# Patient Record
Sex: MALE | Race: WHITE | Employment: OTHER | URBAN - METROPOLITAN AREA
[De-identification: names, ages, dates, MRNs, and addresses within clinical notes are randomized per-mention and may not be internally consistent; named-entity substitution may affect disease eponyms.]

---

## 2019-09-10 RX ORDER — WARFARIN SODIUM 5 MG/1
5 TABLET ORAL DAILY
COMMUNITY

## 2019-09-10 RX ORDER — GLIPIZIDE 5 MG/1
5 TABLET ORAL
COMMUNITY

## 2019-09-10 RX ORDER — M-VIT,TX,IRON,MINS/CALC/FOLIC 27MG-0.4MG
1 TABLET ORAL DAILY
COMMUNITY

## 2019-09-10 RX ORDER — SIMVASTATIN 20 MG
20 TABLET ORAL NIGHTLY
COMMUNITY

## 2019-09-10 RX ORDER — OLMESARTAN MEDOXOMIL 20 MG/1
20 TABLET ORAL DAILY
COMMUNITY

## 2019-09-10 RX ORDER — DIGOXIN 0.05 MG/ML
0.25 SOLUTION ORAL DAILY
COMMUNITY

## 2019-09-10 RX ORDER — AMLODIPINE BESYLATE 10 MG/1
10 TABLET ORAL DAILY
COMMUNITY

## 2019-09-10 SDOH — HEALTH STABILITY: MENTAL HEALTH: HOW OFTEN DO YOU HAVE A DRINK CONTAINING ALCOHOL?: NEVER

## 2019-09-12 ENCOUNTER — ANESTHESIA EVENT (OUTPATIENT)
Dept: OPERATING ROOM | Age: 79
End: 2019-09-12
Payer: MEDICARE

## 2019-09-12 NOTE — ANESTHESIA PRE PROCEDURE
(+) sleep apnea: on CPAP,      (-) not a current smoker                           Cardiovascular:  Exercise tolerance: good (>4 METS),   (+) hypertension:, dysrhythmias: atrial fibrillation, hyperlipidemia    (-) past MI and  angina             ROS comment: H/O A FIB and SSS    ECHO: The left ventricular wall motion is normal.  Overall left ventricular ejection fraction is estimated to be 55-60%. Left ventricular systolic function is normal. (LVEF>/=55%)  There is mild concentric left ventricular hypertrophy. The left atrium is mildly dilated. There is mild mitral regurgitation. Neuro/Psych:      (-) seizures, TIA and CVA           GI/Hepatic/Renal:   (+) liver disease:,      (-) GERD and no renal disease       Endo/Other:    (+) DiabetesType II DM, , .    (-) hypothyroidism               Abdominal:           Vascular:     - DVT and PE. Anesthesia Plan      TIVA and MAC     ASA 3       Induction: intravenous. MIPS: Prophylactic antiemetics administered. Anesthetic plan and risks discussed with patient. Plan discussed with CRNA.           Liv Luis MD

## 2019-09-13 ENCOUNTER — ANESTHESIA (OUTPATIENT)
Dept: OPERATING ROOM | Age: 79
End: 2019-09-13
Payer: MEDICARE

## 2019-09-13 ENCOUNTER — HOSPITAL ENCOUNTER (OUTPATIENT)
Age: 79
Setting detail: OUTPATIENT SURGERY
Discharge: HOME | End: 2019-09-13
Attending: OPHTHALMOLOGY | Admitting: OPHTHALMOLOGY
Payer: MEDICARE

## 2019-09-13 VITALS — SYSTOLIC BLOOD PRESSURE: 137 MMHG | DIASTOLIC BLOOD PRESSURE: 53 MMHG | OXYGEN SATURATION: 100 %

## 2019-09-13 VITALS
RESPIRATION RATE: 14 BRPM | HEART RATE: 51 BPM | WEIGHT: 201 LBS | BODY MASS INDEX: 28.14 KG/M2 | DIASTOLIC BLOOD PRESSURE: 62 MMHG | SYSTOLIC BLOOD PRESSURE: 157 MMHG | OXYGEN SATURATION: 99 % | HEIGHT: 71 IN | TEMPERATURE: 97.4 F

## 2019-09-13 LAB
GLUCOSE BLD-MCNC: 131 MG/DL (ref 70–99)
PERFORMED ON: ABNORMAL

## 2019-09-13 PROCEDURE — 2500000003 HC RX 250 WO HCPCS: Performed by: OPHTHALMOLOGY

## 2019-09-13 PROCEDURE — 7100000010 HC PHASE II RECOVERY - FIRST 15 MIN: Performed by: OPHTHALMOLOGY

## 2019-09-13 PROCEDURE — 2580000003 HC RX 258: Performed by: ANESTHESIOLOGY

## 2019-09-13 PROCEDURE — 6360000002 HC RX W HCPCS: Performed by: NURSE ANESTHETIST, CERTIFIED REGISTERED

## 2019-09-13 PROCEDURE — 3700000000 HC ANESTHESIA ATTENDED CARE: Performed by: OPHTHALMOLOGY

## 2019-09-13 PROCEDURE — 6370000000 HC RX 637 (ALT 250 FOR IP): Performed by: OPHTHALMOLOGY

## 2019-09-13 PROCEDURE — 2709999900 HC NON-CHARGEABLE SUPPLY: Performed by: OPHTHALMOLOGY

## 2019-09-13 PROCEDURE — 6360000002 HC RX W HCPCS: Performed by: OPHTHALMOLOGY

## 2019-09-13 PROCEDURE — 7100000011 HC PHASE II RECOVERY - ADDTL 15 MIN: Performed by: OPHTHALMOLOGY

## 2019-09-13 PROCEDURE — V2632 POST CHMBR INTRAOCULAR LENS: HCPCS | Performed by: OPHTHALMOLOGY

## 2019-09-13 PROCEDURE — 3600000013 HC SURGERY LEVEL 3 ADDTL 15MIN: Performed by: OPHTHALMOLOGY

## 2019-09-13 PROCEDURE — 3600000003 HC SURGERY LEVEL 3 BASE: Performed by: OPHTHALMOLOGY

## 2019-09-13 PROCEDURE — 2500000003 HC RX 250 WO HCPCS: Performed by: ANESTHESIOLOGY

## 2019-09-13 PROCEDURE — 2500000003 HC RX 250 WO HCPCS: Performed by: NURSE ANESTHETIST, CERTIFIED REGISTERED

## 2019-09-13 PROCEDURE — 3700000001 HC ADD 15 MINUTES (ANESTHESIA): Performed by: OPHTHALMOLOGY

## 2019-09-13 DEVICE — ACRYSOF(R) IQ ASPHERIC NATURAL IOL, SINGLE-PIECE ACRYLIC FOLDABLE PCL, UV WITH BLUE LIGHTFILTER, 13.0MM LENGTH, 6.0MM ANTERIORASYMMETRIC BICONVEX OPTIC, PLANAR HAPTICS.
Type: IMPLANTABLE DEVICE | Site: EYE | Status: FUNCTIONAL
Brand: ACRYSOF®

## 2019-09-13 RX ORDER — PROPOFOL 10 MG/ML
INJECTION, EMULSION INTRAVENOUS PRN
Status: DISCONTINUED | OUTPATIENT
Start: 2019-09-13 | End: 2019-09-13 | Stop reason: SDUPTHER

## 2019-09-13 RX ORDER — PILOCARPINE HYDROCHLORIDE 20 MG/ML
SOLUTION/ DROPS OPHTHALMIC PRN
Status: DISCONTINUED | OUTPATIENT
Start: 2019-09-13 | End: 2019-09-13 | Stop reason: ALTCHOICE

## 2019-09-13 RX ORDER — PREDNISOLONE ACETATE 10 MG/ML
1 SUSPENSION/ DROPS OPHTHALMIC SEE ADMIN INSTRUCTIONS
Status: DISCONTINUED | OUTPATIENT
Start: 2019-09-13 | End: 2019-09-13 | Stop reason: HOSPADM

## 2019-09-13 RX ORDER — TETRACAINE HYDROCHLORIDE 5 MG/ML
SOLUTION OPHTHALMIC PRN
Status: DISCONTINUED | OUTPATIENT
Start: 2019-09-13 | End: 2019-09-13 | Stop reason: ALTCHOICE

## 2019-09-13 RX ORDER — LIDOCAINE HYDROCHLORIDE 20 MG/ML
INJECTION, SOLUTION INFILTRATION; PERINEURAL PRN
Status: DISCONTINUED | OUTPATIENT
Start: 2019-09-13 | End: 2019-09-13 | Stop reason: SDUPTHER

## 2019-09-13 RX ORDER — SODIUM CHLORIDE, SODIUM LACTATE, POTASSIUM CHLORIDE, CALCIUM CHLORIDE 600; 310; 30; 20 MG/100ML; MG/100ML; MG/100ML; MG/100ML
INJECTION, SOLUTION INTRAVENOUS CONTINUOUS
Status: DISCONTINUED | OUTPATIENT
Start: 2019-09-13 | End: 2019-09-13 | Stop reason: HOSPADM

## 2019-09-13 RX ORDER — SODIUM CHLORIDE, POTASSIUM CHLORIDE, CALCIUM CHLORIDE, MAGNESIUM CHLORIDE, SODIUM ACETATE, AND SODIUM CITRATE 6.4; .75; .48; .3; 3.9; 1.7 MG/ML; MG/ML; MG/ML; MG/ML; MG/ML; MG/ML
SOLUTION IRRIGATION PRN
Status: DISCONTINUED | OUTPATIENT
Start: 2019-09-13 | End: 2019-09-13 | Stop reason: ALTCHOICE

## 2019-09-13 RX ORDER — TOBRAMYCIN AND DEXAMETHASONE 3; 1 MG/ML; MG/ML
SUSPENSION/ DROPS OPHTHALMIC PRN
Status: DISCONTINUED | OUTPATIENT
Start: 2019-09-13 | End: 2019-09-13 | Stop reason: ALTCHOICE

## 2019-09-13 RX ORDER — PREDNISOLONE ACETATE 10 MG/ML
SUSPENSION/ DROPS OPHTHALMIC PRN
Status: DISCONTINUED | OUTPATIENT
Start: 2019-09-13 | End: 2019-09-13 | Stop reason: ALTCHOICE

## 2019-09-13 RX ORDER — LIDOCAINE HYDROCHLORIDE 10 MG/ML
0.3 INJECTION, SOLUTION EPIDURAL; INFILTRATION; INTRACAUDAL; PERINEURAL
Status: COMPLETED | OUTPATIENT
Start: 2019-09-13 | End: 2019-09-13

## 2019-09-13 RX ORDER — SODIUM CHLORIDE 0.9 % (FLUSH) 0.9 %
10 SYRINGE (ML) INJECTION EVERY 12 HOURS SCHEDULED
Status: DISCONTINUED | OUTPATIENT
Start: 2019-09-13 | End: 2019-09-13 | Stop reason: HOSPADM

## 2019-09-13 RX ORDER — TOBRAMYCIN AND DEXAMETHASONE 3; 1 MG/ML; MG/ML
1 SUSPENSION/ DROPS OPHTHALMIC SEE ADMIN INSTRUCTIONS
Status: DISCONTINUED | OUTPATIENT
Start: 2019-09-13 | End: 2019-09-13 | Stop reason: HOSPADM

## 2019-09-13 RX ORDER — SODIUM CHLORIDE 0.9 % (FLUSH) 0.9 %
10 SYRINGE (ML) INJECTION PRN
Status: DISCONTINUED | OUTPATIENT
Start: 2019-09-13 | End: 2019-09-13 | Stop reason: HOSPADM

## 2019-09-13 RX ADMIN — Medication 0.3 ML: at 08:46

## 2019-09-13 RX ADMIN — Medication 0.3 ML: at 08:35

## 2019-09-13 RX ADMIN — BROMFENAC SODIUM 1 DROP: 0.7 SOLUTION/ DROPS OPHTHALMIC at 08:36

## 2019-09-13 RX ADMIN — LIDOCAINE HYDROCHLORIDE 30 MG: 20 INJECTION, SOLUTION INFILTRATION; PERINEURAL at 09:37

## 2019-09-13 RX ADMIN — SODIUM CHLORIDE, POTASSIUM CHLORIDE, SODIUM LACTATE AND CALCIUM CHLORIDE: 600; 310; 30; 20 INJECTION, SOLUTION INTRAVENOUS at 08:45

## 2019-09-13 RX ADMIN — LIDOCAINE HYDROCHLORIDE 0.3 ML: 10 INJECTION, SOLUTION EPIDURAL; INFILTRATION; INTRACAUDAL; PERINEURAL at 08:46

## 2019-09-13 RX ADMIN — Medication 0.3 ML: at 09:02

## 2019-09-13 RX ADMIN — SODIUM CHLORIDE, POTASSIUM CHLORIDE, SODIUM LACTATE AND CALCIUM CHLORIDE: 600; 310; 30; 20 INJECTION, SOLUTION INTRAVENOUS at 09:37

## 2019-09-13 RX ADMIN — PROPOFOL 65 MG: 10 INJECTION, EMULSION INTRAVENOUS at 09:37

## 2019-09-13 RX ADMIN — TOBRAMYCIN AND DEXAMETHASONE: 3; 1 SUSPENSION/ DROPS OPHTHALMIC at 10:35

## 2019-09-13 ASSESSMENT — PAIN - FUNCTIONAL ASSESSMENT: PAIN_FUNCTIONAL_ASSESSMENT: 0-10

## 2019-09-13 ASSESSMENT — PAIN SCALES - GENERAL
PAINLEVEL_OUTOF10: 0
PAINLEVEL_OUTOF10: 0

## 2019-09-13 ASSESSMENT — PULMONARY FUNCTION TESTS
PIF_VALUE: 0

## 2019-09-13 ASSESSMENT — LIFESTYLE VARIABLES: SMOKING_STATUS: 0

## 2019-09-13 NOTE — PROGRESS NOTES
.Patient pre-op admission complete see flow sheet. IV started. Eye drops started per MD order see MAR. Safety checks complete. Call light and family at bedside.

## 2019-09-13 NOTE — PROGRESS NOTES
Bedside report received from [address] [place] [address] and [facility], pt awake vitals WNl,denies any pain. Scotty Willard

## 2019-09-13 NOTE — OP NOTE
phacoemilsification/aspiration device. This was performed in a bimanual/CHOP technique. The remaining cortex was then removed using the irrigation/aspiration device. The posterior capsule was polished using the I/A device with CAP/VAC settings. The capsular bag was reformed using Provisc. The previously selected Wayne Acrysof +21.0 diopter [id number] intraocular lens implant was then inserted using the cartridge system. It was spun in place using a Kuglen hook. It was centered and found to be in good, stable position. The remaining viscoelastic was then removed using the I/A device. The corneal incision was then hydrated using sterile BSS on a 30 gauge cannula. It was checked and found to be water-tight. Pilocarpine 2%, followed by Tobradex ophthalmic solutions were then instilled into the operative eye. The wire lid speculum was removed, and a postoperative protective shield was applied. The patient was then transferred to the postanesthesia recovery unit in good stable condition. The patient tolerated the procedure well without complications. A postoperative instruction sheet was given, and the patient will follow up with Dr. Juan Ramon [place] office as scheduled.       EBL: none    Specimen: none

## 2023-05-06 ENCOUNTER — HOSPITAL ENCOUNTER (EMERGENCY)
Age: 83
Discharge: HOME | End: 2023-05-06
Payer: MEDICARE

## 2023-05-06 VITALS
HEIGHT: 72 IN | SYSTOLIC BLOOD PRESSURE: 140 MMHG | DIASTOLIC BLOOD PRESSURE: 63 MMHG | BODY MASS INDEX: 26.14 KG/M2 | WEIGHT: 193 LBS | RESPIRATION RATE: 16 BRPM | TEMPERATURE: 98 F | HEART RATE: 51 BPM | OXYGEN SATURATION: 98 %

## 2023-05-06 DIAGNOSIS — S61.213A LACERATION OF LEFT MIDDLE FINGER W/O FOREIGN BODY W/O DAMAGE TO NAIL, INITIAL ENCOUNTER: Primary | ICD-10-CM

## 2023-05-06 PROCEDURE — 90471 IMMUNIZATION ADMIN: CPT | Performed by: PHYSICIAN ASSISTANT

## 2023-05-06 PROCEDURE — 6360000002 HC RX W HCPCS: Performed by: PHYSICIAN ASSISTANT

## 2023-05-06 PROCEDURE — 99284 EMERGENCY DEPT VISIT MOD MDM: CPT

## 2023-05-06 PROCEDURE — 12001 RPR S/N/AX/GEN/TRNK 2.5CM/<: CPT

## 2023-05-06 PROCEDURE — 90715 TDAP VACCINE 7 YRS/> IM: CPT | Performed by: PHYSICIAN ASSISTANT

## 2023-05-06 RX ADMIN — TETANUS TOXOID, REDUCED DIPHTHERIA TOXOID AND ACELLULAR PERTUSSIS VACCINE, ADSORBED 0.5 ML: 5; 2.5; 8; 8; 2.5 SUSPENSION INTRAMUSCULAR at 18:48

## 2023-05-06 ASSESSMENT — PAIN - FUNCTIONAL ASSESSMENT: PAIN_FUNCTIONAL_ASSESSMENT: NONE - DENIES PAIN

## 2023-05-06 NOTE — ED NOTES
Reviewed patient discharge instructions at this time, copy given to patient. No questions or concerns. Patient voiced understanding.         Yokasta Ward RN  05/06/23 [id number]

## 2023-05-06 NOTE — ED PROVIDER NOTES
[facility]        Pt Name: Pily Aguilera  MRN: [id number]  [place] 1940  Date of evaluation: 5/6/2023  Provider: Jeison Baker PA-C  PCP: Osmani Patino DO  Note Started: 5:58 PM EDT 5/6/23      CADY. I have evaluated this patient. My supervising physician was available for consultation. CHIEF COMPLAINT       Chief Complaint   Patient presents with    Laceration     Laceration noted to middle finger. Patient states he cut it on a nail. HISTORY OF PRESENT ILLNESS: 1 or more Elements     History From: patient and family  Limitations to history : None    Pily Aguilera is a 79 y.o. male who presents to the emergency department for evaluation of left finger laceration. He accidentally cut his finger on sharp edge of a nail and he tripped and fell into the bushes. He has a laceration to the left middle finger. Bleeding controlled. Intact sensation and good strength. He also has small wounds abrasions and small skin tear to the left upper arm from bushes. He is on blood thinners. He landed on his hands did not hit his head. No headache. No LOC. Denies any other injuries. Came in due to laceration of his finger. Alert and oriented  GCS 15. Nursing Notes were all reviewed and agreed with or any disagreements were addressed in the HPI. REVIEW OF SYSTEMS :      Review of Systems    Positives and Pertinent negatives as per HPI.      SURGICAL HISTORY     Past Surgical History:   Procedure Laterality Date    CARDIOVERSION  02/01/2017    CATARACT REMOVAL WITH IMPLANT Right 09/13/2019    COLONOSCOPY      INTRACAPSULAR CATARACT EXTRACTION Right 9/13/2019    PHACO EMULSIFICATION OF CATARACT WITH INTRAOCULAR LENS IMPLANT EYE performed by Niesha Huber MD at [address] [place] [address]      Diabetic retinal exam       CURRENTMEDICATIONS       Discharge Medication List as of 5/6/2023  6:51 PM        CONTINUE these medications

## 2023-05-06 NOTE — DISCHARGE INSTRUCTIONS
Clean daily with soap and water but no soaking or submerging wound. Suture removal in 10 days. Return to the ER for any worsening symptoms or signs of infection such as increased pain, redness, or drainage.

## 2024-02-23 ENCOUNTER — HOSPITAL ENCOUNTER (EMERGENCY)
Age: 84
Discharge: HOME | End: 2024-02-23
Attending: STUDENT IN AN ORGANIZED HEALTH CARE EDUCATION/TRAINING PROGRAM
Payer: MEDICARE

## 2024-02-23 ENCOUNTER — APPOINTMENT (OUTPATIENT)
Dept: CT IMAGING | Age: 84
End: 2024-02-23
Payer: MEDICARE

## 2024-02-23 ENCOUNTER — APPOINTMENT (OUTPATIENT)
Dept: GENERAL RADIOLOGY | Age: 84
End: 2024-02-23
Payer: MEDICARE

## 2024-02-23 VITALS
SYSTOLIC BLOOD PRESSURE: 174 MMHG | HEART RATE: 61 BPM | WEIGHT: 173 LBS | RESPIRATION RATE: 16 BRPM | TEMPERATURE: 98.2 F | BODY MASS INDEX: 23.43 KG/M2 | OXYGEN SATURATION: 95 % | DIASTOLIC BLOOD PRESSURE: 60 MMHG | HEIGHT: 72 IN

## 2024-02-23 DIAGNOSIS — E23.6 PITUITARY MASS (HCC): ICD-10-CM

## 2024-02-23 DIAGNOSIS — U07.1 COVID-19: ICD-10-CM
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
## 2024-02-23 LAB
ALBUMIN SERPL-MCNC: 4 G/DL (ref 3.4–5)
ALBUMIN/GLOB SERPL: 1.1 {RATIO} (ref 1.1–2.2)
ALP SERPL-CCNC: 103 U/L (ref 40–129)
ALT SERPL-CCNC: 80 U/L (ref 10–40)
ANION GAP SERPL CALCULATED.3IONS-SCNC: 11 MMOL/L (ref 3–16)
AST SERPL-CCNC: 160 U/L (ref 15–37)
BACTERIA URNS QL MICRO: ABNORMAL /HPF
BASOPHILS # BLD: 0.1 K/UL (ref 0–0.2)
BASOPHILS NFR BLD: 1.7 %
BILIRUB SERPL-MCNC: 0.7 MG/DL (ref 0–1)
BILIRUB UR QL STRIP.AUTO: NEGATIVE
BUN SERPL-MCNC: 18 MG/DL (ref 7–20)
CALCIUM SERPL-MCNC: 9.4 MG/DL (ref 8.3–10.6)
CHLORIDE SERPL-SCNC: 93 MMOL/L (ref 99–110)
CLARITY UR: CLEAR
CO2 SERPL-SCNC: 24 MMOL/L (ref 21–32)
COLOR UR: YELLOW
CREAT SERPL-MCNC: 1.4 MG/DL (ref 0.8–1.3)
DEPRECATED RDW RBC AUTO: 13.6 % (ref 12.4–15.4)
EKG ATRIAL RATE: 60 BPM
EKG DIAGNOSIS: NORMAL
EKG P AXIS: 74 DEGREES
EKG P-R INTERVAL: 238 MS
EKG Q-T INTERVAL: 448 MS
EKG QRS DURATION: 152 MS
EKG QTC CALCULATION (BAZETT): 448 MS
EKG R AXIS: -19 DEGREES
EKG T AXIS: 111 DEGREES
EKG VENTRICULAR RATE: 60 BPM
EOSINOPHIL # BLD: 0.3 K/UL (ref 0–0.6)
EOSINOPHIL NFR BLD: 3.4 %
EPI CELLS #/AREA URNS HPF: ABNORMAL /HPF (ref 0–5)
GFR SERPLBLD CREATININE-BSD FMLA CKD-EPI: 49 ML/MIN/{1.73_M2}
GLUCOSE SERPL-MCNC: 158 MG/DL (ref 70–99)
GLUCOSE UR STRIP.AUTO-MCNC: NEGATIVE MG/DL
HCT VFR BLD AUTO: 33.7 % (ref 40.5–52.5)
HGB BLD-MCNC: 11.8 G/DL (ref 13.5–17.5)
HGB UR QL STRIP.AUTO: ABNORMAL
KETONES UR STRIP.AUTO-MCNC: NEGATIVE MG/DL
LEUKOCYTE ESTERASE UR QL STRIP.AUTO: NEGATIVE
LYMPHOCYTES # BLD: 1.2 K/UL (ref 1–5.1)
LYMPHOCYTES NFR BLD: 14.3 %
MCH RBC QN AUTO: 29.4 PG (ref 26–34)
MCHC RBC AUTO-ENTMCNC: 35 G/DL (ref 31–36)
MCV RBC AUTO: 84.2 FL (ref 80–100)
MONOCYTES # BLD: 1 K/UL (ref 0–1.3)
MONOCYTES NFR BLD: 11.9 %
MUCOUS THREADS #/AREA URNS LPF: ABNORMAL /LPF
NEUTROPHILS # BLD: 5.6 K/UL (ref 1.7–7.7)
NEUTROPHILS NFR BLD: 68.7 %
NITRITE UR QL STRIP.AUTO: NEGATIVE
PH UR STRIP.AUTO: 6.5 [PH] (ref 5–8)
PLATELET # BLD AUTO: 166 K/UL (ref 135–450)
PMV BLD AUTO: 7.9 FL (ref 5–10.5)
POTASSIUM SERPL-SCNC: 4.9 MMOL/L (ref 3.5–5.1)
PROT SERPL-MCNC: 7.5 G/DL (ref 6.4–8.2)
PROT UR STRIP.AUTO-MCNC: >=300 MG/DL
RBC # BLD AUTO: 4 M/UL (ref 4.2–5.9)
RBC #/AREA URNS HPF: ABNORMAL /HPF (ref 0–4)
SARS-COV-2 RDRP RESP QL NAA+PROBE: DETECTED
SODIUM SERPL-SCNC: 128 MMOL/L (ref 136–145)
SP GR UR STRIP.AUTO: 1.02 (ref 1–1.03)
TROPONIN, HIGH SENSITIVITY: 68 NG/L (ref 0–22)
TROPONIN, HIGH SENSITIVITY: 73 NG/L (ref 0–22)
UA DIPSTICK W REFLEX MICRO PNL UR: YES
URN SPEC COLLECT METH UR: ABNORMAL
UROBILINOGEN UR STRIP-ACNC: 0.2 E.U./DL
WBC # BLD AUTO: 8.1 K/UL (ref 4–11)
WBC #/AREA URNS HPF: ABNORMAL /HPF (ref 0–5)

## 2024-02-23 PROCEDURE — 85025 COMPLETE CBC W/AUTO DIFF WBC: CPT

## 2024-02-23 PROCEDURE — 70450 CT HEAD/BRAIN W/O DYE: CPT

## 2024-02-23 PROCEDURE — 84484 ASSAY OF TROPONIN QUANT: CPT

## 2024-02-23 PROCEDURE — 80053 COMPREHEN METABOLIC PANEL: CPT

## 2024-02-23 PROCEDURE — 87635 SARS-COV-2 COVID-19 AMP PRB: CPT

## 2024-02-23 PROCEDURE — 99285 EMERGENCY DEPT VISIT HI MDM: CPT

## 2024-02-23 PROCEDURE — 81001 URINALYSIS AUTO W/SCOPE: CPT

## 2024-02-23 PROCEDURE — 36415 COLL VENOUS BLD VENIPUNCTURE: CPT

## 2024-02-23 PROCEDURE — 71045 X-RAY EXAM CHEST 1 VIEW: CPT

## 2024-02-23 PROCEDURE — 93005 ELECTROCARDIOGRAM TRACING: CPT | Performed by: STUDENT IN AN ORGANIZED HEALTH CARE EDUCATION/TRAINING PROGRAM

## 2024-02-23 ASSESSMENT — PAIN - FUNCTIONAL ASSESSMENT: PAIN_FUNCTIONAL_ASSESSMENT: NONE - DENIES PAIN

## 2024-02-23 NOTE — ED NOTES
Pt discharge instructions, follow up reviewed with pt. Pt verbalized understanding. No further needs. Pt discharged at this time.      Yes

## 2024-02-23 NOTE — ED PROVIDER NOTES
Alert and oriented X 3. CN II-XII intact. No gross facial drooping.  Strength 5/5 in all extremities.  Sensation intact. No pronator drift.   PSYCHIATRIC: Normal mood and affect.    DIAGNOSTIC RESULTS     LABS:   Labs Reviewed   COVID-19, RAPID - Abnormal; Notable for the following components:       Result Value    SARS-CoV-2, NAAT DETECTED (*)     All other components within normal limits   CBC WITH AUTO DIFFERENTIAL - Abnormal; Notable for the following components:    RBC 4.00 (*)     Hemoglobin 11.8 (*)     Hematocrit 33.7 (*)     All other components within normal limits   COMPREHENSIVE METABOLIC PANEL W/ REFLEX TO MG FOR LOW K - Abnormal; Notable for the following components:    Sodium 128 (*)     Chloride 93 (*)     Glucose 158 (*)     Creatinine 1.4 (*)     Est, Glom Filt Rate 49 (*)     ALT 80 (*)      (*)     All other components within normal limits   TROPONIN - Abnormal; Notable for the following components:    Troponin, High Sensitivity 73 (*)     All other components within normal limits   TROPONIN - Abnormal; Notable for the following components:    Troponin, High Sensitivity 68 (*)     All other components within normal limits   URINALYSIS - Abnormal; Notable for the following components:    Blood, Urine MODERATE (*)     Protein, UA >=300 (*)     All other components within normal limits   MICROSCOPIC URINALYSIS - Abnormal; Notable for the following components:    Mucus, UA 1+ (*)     Bacteria, UA Rare (*)     All other components within normal limits      When ordered only abnormal lab results are displayed. All other labs were within normal range or not returned as of this dictation.     RADIOLOGY:      Non-plain film images such as CT, Ultrasound and MRI are read by the radiologist. Plain radiographic images are visualized and preliminarily interpreted by the ED Provider with the below findings:   Interpretation per the Radiologist below, if available at the time of this note:  XR CHEST

## 2025-02-25 ENCOUNTER — APPOINTMENT (OUTPATIENT)
Dept: CT IMAGING | Age: 85
End: 2025-02-25
Payer: MEDICARE

## 2025-02-25 ENCOUNTER — APPOINTMENT (OUTPATIENT)
Dept: GENERAL RADIOLOGY | Age: 85
End: 2025-02-25
Payer: MEDICARE

## 2025-02-25 ENCOUNTER — HOSPITAL ENCOUNTER (EMERGENCY)
Age: 85
Discharge: ANOTHER HOSPITAL | End: 2025-02-26
Attending: EMERGENCY MEDICINE
Payer: MEDICARE

## 2025-02-25 DIAGNOSIS — T79.6XXA TRAUMATIC RHABDOMYOLYSIS, INITIAL ENCOUNTER: ICD-10-CM

## 2025-02-25 DIAGNOSIS — N17.9 AKI (ACUTE KIDNEY INJURY): Primary | ICD-10-CM

## 2025-02-25 DIAGNOSIS — A41.9 SEPSIS, DUE TO UNSPECIFIED ORGANISM, UNSPECIFIED WHETHER ACUTE ORGAN DYSFUNCTION PRESENT (HCC): ICD-10-CM

## 2025-02-25 LAB
ALBUMIN SERPL-MCNC: 3.4 G/DL (ref 3.4–5)
ALBUMIN/GLOB SERPL: 0.9 {RATIO} (ref 1.1–2.2)
ALP SERPL-CCNC: 73 U/L (ref 40–129)
ALT SERPL-CCNC: 30 U/L (ref 10–40)
ANION GAP SERPL CALCULATED.3IONS-SCNC: 17 MMOL/L (ref 3–16)
AST SERPL-CCNC: 88 U/L (ref 15–37)
BASE EXCESS BLDV CALC-SCNC: -0.6 MMOL/L (ref -3–3)
BASOPHILS # BLD: 0.2 K/UL (ref 0–0.2)
BASOPHILS NFR BLD: 1.3 %
BILIRUB SERPL-MCNC: 0.9 MG/DL (ref 0–1)
BUN SERPL-MCNC: 26 MG/DL (ref 7–20)
CALCIUM SERPL-MCNC: 9.4 MG/DL (ref 8.3–10.6)
CHLORIDE SERPL-SCNC: 96 MMOL/L (ref 99–110)
CK SERPL-CCNC: 2555 U/L (ref 39–308)
CO2 BLDV-SCNC: 26 MMOL/L
CO2 SERPL-SCNC: 22 MMOL/L (ref 21–32)
COHGB MFR BLDV: 1.9 % (ref 0–1.5)
CREAT SERPL-MCNC: 3.1 MG/DL (ref 0.8–1.3)
DEPRECATED RDW RBC AUTO: 13.7 % (ref 12.4–15.4)
DIGOXIN SERPL-MCNC: <0.3 NG/ML (ref 0.8–2)
EOSINOPHIL # BLD: 0.4 K/UL (ref 0–0.6)
EOSINOPHIL NFR BLD: 2 %
FLUAV RNA UPPER RESP QL NAA+PROBE: NEGATIVE
FLUBV AG NPH QL: NEGATIVE
GFR SERPLBLD CREATININE-BSD FMLA CKD-EPI: 19 ML/MIN/{1.73_M2}
GLUCOSE SERPL-MCNC: 219 MG/DL (ref 70–99)
HCO3 BLDV-SCNC: 24.4 MMOL/L (ref 23–29)
HCT VFR BLD AUTO: 40.4 % (ref 40.5–52.5)
HGB BLD-MCNC: 13.1 G/DL (ref 13.5–17.5)
INR PPP: 1.16 (ref 0.85–1.15)
LACTATE BLDV-SCNC: 2.7 MMOL/L (ref 0.4–1.9)
LYMPHOCYTES # BLD: 3.9 K/UL (ref 1–5.1)
LYMPHOCYTES NFR BLD: 21.9 %
MCH RBC QN AUTO: 28.7 PG (ref 26–34)
MCHC RBC AUTO-ENTMCNC: 32.5 G/DL (ref 31–36)
MCV RBC AUTO: 88.4 FL (ref 80–100)
METHGB MFR BLDV: 0.3 %
MONOCYTES # BLD: 1.4 K/UL (ref 0–1.3)
MONOCYTES NFR BLD: 7.8 %
NEUTROPHILS # BLD: 12 K/UL (ref 1.7–7.7)
NEUTROPHILS NFR BLD: 67 %
NT-PROBNP SERPL-MCNC: 3139 PG/ML (ref 0–449)
O2 THERAPY: ABNORMAL
PCO2 BLDV: 41.2 MMHG (ref 40–50)
PH BLDV: 7.39 [PH] (ref 7.35–7.45)
PLATELET # BLD AUTO: 240 K/UL (ref 135–450)
PMV BLD AUTO: 9.2 FL (ref 5–10.5)
PO2 BLDV: 27.2 MMHG (ref 25–40)
POTASSIUM SERPL-SCNC: 4.3 MMOL/L (ref 3.5–5.1)
PROT SERPL-MCNC: 7.1 G/DL (ref 6.4–8.2)
PROTHROMBIN TIME: 15 SEC (ref 11.9–14.9)
RBC # BLD AUTO: 4.57 M/UL (ref 4.2–5.9)
SAO2 % BLDV: 50 %
SARS-COV-2 RDRP RESP QL NAA+PROBE: NOT DETECTED
SODIUM SERPL-SCNC: 135 MMOL/L (ref 136–145)
TROPONIN, HIGH SENSITIVITY: 95 NG/L (ref 0–22)
WBC # BLD AUTO: 17.8 K/UL (ref 4–11)

## 2025-02-25 PROCEDURE — 93005 ELECTROCARDIOGRAM TRACING: CPT | Performed by: EMERGENCY MEDICINE

## 2025-02-25 PROCEDURE — 6360000002 HC RX W HCPCS: Performed by: STUDENT IN AN ORGANIZED HEALTH CARE EDUCATION/TRAINING PROGRAM

## 2025-02-25 PROCEDURE — 85610 PROTHROMBIN TIME: CPT

## 2025-02-25 PROCEDURE — 84484 ASSAY OF TROPONIN QUANT: CPT

## 2025-02-25 PROCEDURE — 82803 BLOOD GASES ANY COMBINATION: CPT

## 2025-02-25 PROCEDURE — 2580000003 HC RX 258: Performed by: EMERGENCY MEDICINE

## 2025-02-25 PROCEDURE — 51798 US URINE CAPACITY MEASURE: CPT

## 2025-02-25 PROCEDURE — 83605 ASSAY OF LACTIC ACID: CPT

## 2025-02-25 PROCEDURE — 36415 COLL VENOUS BLD VENIPUNCTURE: CPT

## 2025-02-25 PROCEDURE — 71045 X-RAY EXAM CHEST 1 VIEW: CPT

## 2025-02-25 PROCEDURE — 87635 SARS-COV-2 COVID-19 AMP PRB: CPT

## 2025-02-25 PROCEDURE — 72125 CT NECK SPINE W/O DYE: CPT

## 2025-02-25 PROCEDURE — 99285 EMERGENCY DEPT VISIT HI MDM: CPT

## 2025-02-25 PROCEDURE — 70450 CT HEAD/BRAIN W/O DYE: CPT

## 2025-02-25 PROCEDURE — 80162 ASSAY OF DIGOXIN TOTAL: CPT

## 2025-02-25 PROCEDURE — 2580000003 HC RX 258: Performed by: STUDENT IN AN ORGANIZED HEALTH CARE EDUCATION/TRAINING PROGRAM

## 2025-02-25 PROCEDURE — 83880 ASSAY OF NATRIURETIC PEPTIDE: CPT

## 2025-02-25 PROCEDURE — 87804 INFLUENZA ASSAY W/OPTIC: CPT

## 2025-02-25 PROCEDURE — 80053 COMPREHEN METABOLIC PANEL: CPT

## 2025-02-25 PROCEDURE — 85025 COMPLETE CBC W/AUTO DIFF WBC: CPT

## 2025-02-25 PROCEDURE — 96365 THER/PROPH/DIAG IV INF INIT: CPT

## 2025-02-25 PROCEDURE — 82550 ASSAY OF CK (CPK): CPT

## 2025-02-25 PROCEDURE — 87040 BLOOD CULTURE FOR BACTERIA: CPT

## 2025-02-25 PROCEDURE — 6370000000 HC RX 637 (ALT 250 FOR IP): Performed by: EMERGENCY MEDICINE

## 2025-02-25 RX ORDER — ACETAMINOPHEN 650 MG/1
650 SUPPOSITORY RECTAL ONCE
Status: COMPLETED | OUTPATIENT
Start: 2025-02-25 | End: 2025-02-25

## 2025-02-25 RX ORDER — 0.9 % SODIUM CHLORIDE 0.9 %
500 INTRAVENOUS SOLUTION INTRAVENOUS ONCE
Status: COMPLETED | OUTPATIENT
Start: 2025-02-25 | End: 2025-02-25

## 2025-02-25 RX ORDER — ACETAMINOPHEN 500 MG
1000 TABLET ORAL ONCE
Status: DISCONTINUED | OUTPATIENT
Start: 2025-02-25 | End: 2025-02-25

## 2025-02-25 RX ADMIN — ACETAMINOPHEN 650 MG: 650 SUPPOSITORY RECTAL at 20:35

## 2025-02-25 RX ADMIN — SODIUM CHLORIDE 500 ML: 0.9 INJECTION, SOLUTION INTRAVENOUS at 20:34

## 2025-02-25 RX ADMIN — SODIUM CHLORIDE 1000 MG: 9 INJECTION, SOLUTION INTRAVENOUS at 22:44

## 2025-02-26 ENCOUNTER — HOSPITAL ENCOUNTER (INPATIENT)
Age: 85
LOS: 3 days | Discharge: NURSING, REHAB OR LONG-TERM CARE | End: 2025-03-01
Attending: STUDENT IN AN ORGANIZED HEALTH CARE EDUCATION/TRAINING PROGRAM | Admitting: STUDENT IN AN ORGANIZED HEALTH CARE EDUCATION/TRAINING PROGRAM
Payer: MEDICARE

## 2025-02-26 VITALS
SYSTOLIC BLOOD PRESSURE: 119 MMHG | HEART RATE: 90 BPM | BODY MASS INDEX: 24.49 KG/M2 | TEMPERATURE: 98.7 F | DIASTOLIC BLOOD PRESSURE: 58 MMHG | OXYGEN SATURATION: 95 % | WEIGHT: 180.56 LBS | RESPIRATION RATE: 25 BRPM

## 2025-02-26 PROBLEM — M62.82 RHABDOMYOLYSIS: Status: ACTIVE | Noted: 2025-02-26

## 2025-02-26 LAB
ALBUMIN SERPL-MCNC: 3.5 G/DL (ref 3.4–5)
ALBUMIN/GLOB SERPL: 1 {RATIO}
ALP SERPL-CCNC: 69 U/L (ref 40–129)
ALT SERPL-CCNC: 32 U/L (ref 10–40)
ANION GAP SERPL CALCULATED.3IONS-SCNC: 16 MMOL/L (ref 3–16)
ANION GAP SERPL CALCULATED.3IONS-SCNC: 17 MMOL/L (ref 3–16)
AST SERPL-CCNC: 83 U/L (ref 15–37)
BASOPHILS # BLD: 0.06 K/UL (ref 0–0.2)
BASOPHILS # BLD: 0.06 K/UL (ref 0–0.2)
BASOPHILS NFR BLD: 0 %
BASOPHILS NFR BLD: 0 %
BILIRUB SERPL-MCNC: 0.6 MG/DL (ref 0–1)
BUN SERPL-MCNC: 33 MG/DL (ref 7–20)
BUN SERPL-MCNC: 37 MG/DL (ref 7–20)
CALCIUM SERPL-MCNC: 9.2 MG/DL (ref 8.3–10.6)
CALCIUM SERPL-MCNC: 9.3 MG/DL (ref 8.3–10.6)
CHLORIDE SERPL-SCNC: 98 MMOL/L (ref 99–110)
CHLORIDE SERPL-SCNC: 99 MMOL/L (ref 99–110)
CK SERPL-CCNC: 1827 U/L (ref 39–308)
CO2 SERPL-SCNC: 20 MMOL/L (ref 21–32)
CO2 SERPL-SCNC: 22 MMOL/L (ref 21–32)
CREAT SERPL-MCNC: 2.8 MG/DL (ref 0.8–1.3)
CREAT SERPL-MCNC: 3.1 MG/DL (ref 0.8–1.3)
EKG ATRIAL RATE: 110 BPM
EKG DIAGNOSIS: NORMAL
EKG P AXIS: 48 DEGREES
EKG P-R INTERVAL: 140 MS
EKG Q-T INTERVAL: 410 MS
EKG QRS DURATION: 140 MS
EKG QTC CALCULATION (BAZETT): 554 MS
EKG R AXIS: 22 DEGREES
EKG T AXIS: 169 DEGREES
EKG VENTRICULAR RATE: 110 BPM
EOSINOPHIL # BLD: 0.25 K/UL (ref 0–0.6)
EOSINOPHIL # BLD: 0.26 K/UL (ref 0–0.6)
EOSINOPHILS RELATIVE PERCENT: 2 %
EOSINOPHILS RELATIVE PERCENT: 2 %
ERYTHROCYTE [DISTWIDTH] IN BLOOD BY AUTOMATED COUNT: 12.6 % (ref 12.4–15.4)
ERYTHROCYTE [DISTWIDTH] IN BLOOD BY AUTOMATED COUNT: 12.6 % (ref 12.4–15.4)
GFR, ESTIMATED: 19 ML/MIN/1.73M2
GFR, ESTIMATED: 22 ML/MIN/1.73M2
GLUCOSE BLD-MCNC: 168 MG/DL (ref 70–99)
GLUCOSE BLD-MCNC: 173 MG/DL (ref 70–99)
GLUCOSE BLD-MCNC: 185 MG/DL (ref 70–99)
GLUCOSE BLD-MCNC: 195 MG/DL (ref 70–99)
GLUCOSE SERPL-MCNC: 172 MG/DL (ref 70–99)
GLUCOSE SERPL-MCNC: 204 MG/DL (ref 70–99)
HCT VFR BLD AUTO: 36.2 % (ref 40.5–52.5)
HCT VFR BLD AUTO: 36.8 % (ref 40.5–52.5)
HGB BLD-MCNC: 12.6 G/DL (ref 13.5–17.5)
HGB BLD-MCNC: 12.8 G/DL (ref 13.5–17.5)
IMM GRANULOCYTES # BLD AUTO: 0.1 K/UL (ref 0–0.5)
IMM GRANULOCYTES # BLD AUTO: 0.13 K/UL (ref 0–0.5)
IMM GRANULOCYTES NFR BLD: 1 %
IMM GRANULOCYTES NFR BLD: 1 %
LACTATE BLDV-SCNC: 2 MMOL/L (ref 0.4–2)
LYMPHOCYTES NFR BLD: 2.17 K/UL (ref 1–5.1)
LYMPHOCYTES NFR BLD: 2.46 K/UL (ref 1–5.1)
LYMPHOCYTES RELATIVE PERCENT: 16 %
LYMPHOCYTES RELATIVE PERCENT: 18 %
MCH RBC QN AUTO: 29.4 PG (ref 26–34)
MCH RBC QN AUTO: 29.5 PG (ref 26–34)
MCHC RBC AUTO-ENTMCNC: 34.8 G/DL (ref 31–36)
MCHC RBC AUTO-ENTMCNC: 34.8 G/DL (ref 31–36)
MCV RBC AUTO: 84.4 FL (ref 80–100)
MCV RBC AUTO: 84.8 FL (ref 80–100)
MONOCYTES NFR BLD: 1.08 K/UL (ref 0–1.3)
MONOCYTES NFR BLD: 1.09 K/UL (ref 0–1.3)
MONOCYTES NFR BLD: 8 %
MONOCYTES NFR BLD: 8 %
NEUTROPHILS NFR BLD: 71 %
NEUTROPHILS NFR BLD: 73 %
NEUTS SEG NFR BLD: 10.02 K/UL (ref 1.7–7.7)
NEUTS SEG NFR BLD: 9.38 K/UL (ref 1.7–7.7)
PLATELET # BLD AUTO: 188 K/UL (ref 135–450)
PLATELET # BLD AUTO: 190 K/UL (ref 135–450)
PMV BLD AUTO: 10 FL
PMV BLD AUTO: 10.2 FL
POTASSIUM SERPL-SCNC: 4.3 MMOL/L (ref 3.5–5.1)
POTASSIUM SERPL-SCNC: 4.5 MMOL/L (ref 3.5–5.1)
PROCALCITONIN SERPL-MCNC: 1.22 NG/ML (ref 0–0.15)
PROT SERPL-MCNC: 7 G/DL (ref 6.4–8.2)
RBC # BLD AUTO: 4.27 M/UL (ref 4.2–5.9)
RBC # BLD AUTO: 4.36 M/UL (ref 4.2–5.9)
SODIUM SERPL-SCNC: 135 MMOL/L (ref 136–145)
SODIUM SERPL-SCNC: 136 MMOL/L (ref 136–145)
TROPONIN I SERPL HS-MCNC: 81 NG/L (ref 0–22)
TROPONIN I SERPL HS-MCNC: 97 NG/L (ref 0–22)
WBC OTHER # BLD: 13.3 K/UL (ref 4–11)
WBC OTHER # BLD: 13.7 K/UL (ref 4–11)

## 2025-02-26 PROCEDURE — 85025 COMPLETE CBC W/AUTO DIFF WBC: CPT

## 2025-02-26 PROCEDURE — 97530 THERAPEUTIC ACTIVITIES: CPT

## 2025-02-26 PROCEDURE — 97165 OT EVAL LOW COMPLEX 30 MIN: CPT

## 2025-02-26 PROCEDURE — 51798 US URINE CAPACITY MEASURE: CPT

## 2025-02-26 PROCEDURE — 36415 COLL VENOUS BLD VENIPUNCTURE: CPT

## 2025-02-26 PROCEDURE — 6360000002 HC RX W HCPCS: Performed by: STUDENT IN AN ORGANIZED HEALTH CARE EDUCATION/TRAINING PROGRAM

## 2025-02-26 PROCEDURE — 82962 GLUCOSE BLOOD TEST: CPT

## 2025-02-26 PROCEDURE — 84145 PROCALCITONIN (PCT): CPT

## 2025-02-26 PROCEDURE — 84484 ASSAY OF TROPONIN QUANT: CPT

## 2025-02-26 PROCEDURE — 51702 INSERT TEMP BLADDER CATH: CPT

## 2025-02-26 PROCEDURE — 80053 COMPREHEN METABOLIC PANEL: CPT

## 2025-02-26 PROCEDURE — 6370000000 HC RX 637 (ALT 250 FOR IP): Performed by: STUDENT IN AN ORGANIZED HEALTH CARE EDUCATION/TRAINING PROGRAM

## 2025-02-26 PROCEDURE — 81001 URINALYSIS AUTO W/SCOPE: CPT

## 2025-02-26 PROCEDURE — 6360000002 HC RX W HCPCS: Performed by: HOSPITALIST

## 2025-02-26 PROCEDURE — 99223 1ST HOSP IP/OBS HIGH 75: CPT | Performed by: HOSPITALIST

## 2025-02-26 PROCEDURE — 92610 EVALUATE SWALLOWING FUNCTION: CPT

## 2025-02-26 PROCEDURE — 97110 THERAPEUTIC EXERCISES: CPT

## 2025-02-26 PROCEDURE — 2060000000 HC ICU INTERMEDIATE R&B

## 2025-02-26 PROCEDURE — 83605 ASSAY OF LACTIC ACID: CPT

## 2025-02-26 PROCEDURE — 92526 ORAL FUNCTION THERAPY: CPT

## 2025-02-26 PROCEDURE — 97162 PT EVAL MOD COMPLEX 30 MIN: CPT

## 2025-02-26 PROCEDURE — 82550 ASSAY OF CK (CPK): CPT

## 2025-02-26 PROCEDURE — 2580000003 HC RX 258: Performed by: STUDENT IN AN ORGANIZED HEALTH CARE EDUCATION/TRAINING PROGRAM

## 2025-02-26 PROCEDURE — 93010 ELECTROCARDIOGRAM REPORT: CPT | Performed by: INTERNAL MEDICINE

## 2025-02-26 PROCEDURE — 2500000003 HC RX 250 WO HCPCS: Performed by: STUDENT IN AN ORGANIZED HEALTH CARE EDUCATION/TRAINING PROGRAM

## 2025-02-26 PROCEDURE — 6370000000 HC RX 637 (ALT 250 FOR IP): Performed by: HOSPITALIST

## 2025-02-26 PROCEDURE — 80048 BASIC METABOLIC PNL TOTAL CA: CPT

## 2025-02-26 RX ORDER — ONDANSETRON 4 MG/1
4 TABLET, ORALLY DISINTEGRATING ORAL EVERY 8 HOURS PRN
Status: DISCONTINUED | OUTPATIENT
Start: 2025-02-26 | End: 2025-03-01 | Stop reason: HOSPADM

## 2025-02-26 RX ORDER — POLYETHYLENE GLYCOL 3350 17 G/17G
17 POWDER, FOR SOLUTION ORAL DAILY PRN
Status: DISCONTINUED | OUTPATIENT
Start: 2025-02-26 | End: 2025-03-01 | Stop reason: HOSPADM

## 2025-02-26 RX ORDER — GLUCAGON 1 MG/ML
1 KIT INJECTION PRN
Status: DISCONTINUED | OUTPATIENT
Start: 2025-02-26 | End: 2025-03-01 | Stop reason: HOSPADM

## 2025-02-26 RX ORDER — INSULIN LISPRO 100 [IU]/ML
0-4 INJECTION, SOLUTION INTRAVENOUS; SUBCUTANEOUS
Status: DISCONTINUED | OUTPATIENT
Start: 2025-02-26 | End: 2025-03-01 | Stop reason: HOSPADM

## 2025-02-26 RX ORDER — LINEZOLID 2 MG/ML
600 INJECTION, SOLUTION INTRAVENOUS EVERY 12 HOURS
Status: DISCONTINUED | OUTPATIENT
Start: 2025-02-26 | End: 2025-02-28

## 2025-02-26 RX ORDER — SODIUM CHLORIDE 0.9 % (FLUSH) 0.9 %
5-40 SYRINGE (ML) INJECTION EVERY 12 HOURS SCHEDULED
Status: DISCONTINUED | OUTPATIENT
Start: 2025-02-26 | End: 2025-03-01 | Stop reason: HOSPADM

## 2025-02-26 RX ORDER — ENOXAPARIN SODIUM 100 MG/ML
30 INJECTION SUBCUTANEOUS DAILY
Status: DISCONTINUED | OUTPATIENT
Start: 2025-02-26 | End: 2025-02-28

## 2025-02-26 RX ORDER — GLIPIZIDE 2.5 MG/1
TABLET, EXTENDED RELEASE ORAL
COMMUNITY
Start: 2025-02-17

## 2025-02-26 RX ORDER — LABETALOL HYDROCHLORIDE 5 MG/ML
10 INJECTION, SOLUTION INTRAVENOUS EVERY 6 HOURS PRN
Status: DISCONTINUED | OUTPATIENT
Start: 2025-02-26 | End: 2025-03-01 | Stop reason: HOSPADM

## 2025-02-26 RX ORDER — SODIUM CHLORIDE 9 MG/ML
INJECTION, SOLUTION INTRAVENOUS PRN
Status: DISCONTINUED | OUTPATIENT
Start: 2025-02-26 | End: 2025-03-01 | Stop reason: HOSPADM

## 2025-02-26 RX ORDER — ACETAMINOPHEN 325 MG/1
650 TABLET ORAL EVERY 6 HOURS PRN
Status: DISCONTINUED | OUTPATIENT
Start: 2025-02-26 | End: 2025-03-01 | Stop reason: HOSPADM

## 2025-02-26 RX ORDER — OXYCODONE HYDROCHLORIDE 5 MG/1
5 TABLET ORAL EVERY 4 HOURS PRN
Status: DISCONTINUED | OUTPATIENT
Start: 2025-02-26 | End: 2025-03-01 | Stop reason: HOSPADM

## 2025-02-26 RX ORDER — ONDANSETRON 2 MG/ML
4 INJECTION INTRAMUSCULAR; INTRAVENOUS EVERY 6 HOURS PRN
Status: DISCONTINUED | OUTPATIENT
Start: 2025-02-26 | End: 2025-03-01 | Stop reason: HOSPADM

## 2025-02-26 RX ORDER — SODIUM CHLORIDE 9 MG/ML
INJECTION, SOLUTION INTRAVENOUS CONTINUOUS
Status: ACTIVE | OUTPATIENT
Start: 2025-02-26 | End: 2025-02-27

## 2025-02-26 RX ORDER — OXYCODONE HYDROCHLORIDE 5 MG/1
10 TABLET ORAL EVERY 4 HOURS PRN
Status: DISCONTINUED | OUTPATIENT
Start: 2025-02-26 | End: 2025-03-01 | Stop reason: HOSPADM

## 2025-02-26 RX ORDER — ACETAMINOPHEN 650 MG/1
650 SUPPOSITORY RECTAL EVERY 6 HOURS PRN
Status: DISCONTINUED | OUTPATIENT
Start: 2025-02-26 | End: 2025-03-01 | Stop reason: HOSPADM

## 2025-02-26 RX ORDER — ASPIRIN 81 MG/1
81 TABLET, CHEWABLE ORAL DAILY
Status: DISCONTINUED | OUTPATIENT
Start: 2025-02-26 | End: 2025-03-01 | Stop reason: HOSPADM

## 2025-02-26 RX ORDER — SODIUM CHLORIDE 0.9 % (FLUSH) 0.9 %
5-40 SYRINGE (ML) INJECTION PRN
Status: DISCONTINUED | OUTPATIENT
Start: 2025-02-26 | End: 2025-03-01 | Stop reason: HOSPADM

## 2025-02-26 RX ORDER — DEXTROSE MONOHYDRATE 100 MG/ML
INJECTION, SOLUTION INTRAVENOUS CONTINUOUS PRN
Status: DISCONTINUED | OUTPATIENT
Start: 2025-02-26 | End: 2025-03-01 | Stop reason: HOSPADM

## 2025-02-26 RX ADMIN — SODIUM CHLORIDE 1500 MG: 9 INJECTION, SOLUTION INTRAVENOUS at 09:23

## 2025-02-26 RX ADMIN — INSULIN LISPRO 1 UNITS: 100 INJECTION, SOLUTION INTRAVENOUS; SUBCUTANEOUS at 11:51

## 2025-02-26 RX ADMIN — LINEZOLID 600 MG: 600 INJECTION, SOLUTION INTRAVENOUS at 21:42

## 2025-02-26 RX ADMIN — CEFEPIME 1000 MG: 1 INJECTION, POWDER, FOR SOLUTION INTRAMUSCULAR; INTRAVENOUS at 05:17

## 2025-02-26 RX ADMIN — SODIUM CHLORIDE, PRESERVATIVE FREE 10 ML: 5 INJECTION INTRAVENOUS at 21:44

## 2025-02-26 RX ADMIN — CEFEPIME 1000 MG: 1 INJECTION, POWDER, FOR SOLUTION INTRAMUSCULAR; INTRAVENOUS at 21:39

## 2025-02-26 RX ADMIN — ASPIRIN 81 MG: 81 TABLET, CHEWABLE ORAL at 17:43

## 2025-02-26 RX ADMIN — ENOXAPARIN SODIUM 30 MG: 100 INJECTION SUBCUTANEOUS at 08:49

## 2025-02-26 RX ADMIN — SODIUM CHLORIDE: 0.9 INJECTION, SOLUTION INTRAVENOUS at 05:09

## 2025-02-26 RX ADMIN — ACETAMINOPHEN 650 MG: 325 TABLET ORAL at 11:39

## 2025-02-26 RX ADMIN — ACETAMINOPHEN 650 MG: 325 TABLET ORAL at 17:43

## 2025-02-26 ASSESSMENT — PAIN DESCRIPTION - LOCATION: LOCATION: HEAD

## 2025-02-26 ASSESSMENT — PAIN SCALES - GENERAL: PAINLEVEL_OUTOF10: 1

## 2025-02-26 ASSESSMENT — PAIN DESCRIPTION - DESCRIPTORS: DESCRIPTORS: ACHING

## 2025-02-26 NOTE — PROGRESS NOTES
Speech Language Pathology  Clinical Bedside Swallow Assessment and Treatment Note  Facility/Department: [facility] PROGRESSIVE CARE    Recommendations:   Diet recommendation: IDDSI 5 Minced and moist Solids; IDDSI 0 Thin Liquids; Meds PO as tolerated  Instrumentation: Not clinically indicated at this time. Will continue to monitor  Risk management: upright for all intake, stay upright for at least 30 mins after intake, small bites/sips, 1:1 supervision with intake, oral care 2-3x/day to reduce adverse affects in the event of aspiration, increase physical mobility as able, alternate bites/sips, slow rate of intake, general GERD precautions, STRICT aspiration precautions, and hold PO and contact SLP if s/s of aspiration or worsening respiratory status develop.    NAME:Yrn Martinez  : 1940 (84 y.o.)   MRN: [id number]  ROOM: [address] [place] [address]  ADMISSION DATE: 2025  PATIENT DIAGNOSIS(ES): Rhabdomyolysis [M62.82]  No chief complaint on file.    Patient Active Problem List    Diagnosis Date Noted    Rhabdomyolysis 2025     Past Medical History:   Diagnosis Date    Atrial fibrillation (HCC)     Diabetes mellitus (HCC)     Hyperlipidemia     Hypertension     Rosacea     Sleep apnea     SSS (sick sinus syndrome) ([facility])          Past Surgical History:   Procedure Laterality Date    CARDIOVERSION  2017    CATARACT REMOVAL WITH IMPLANT Right 2019    COLONOSCOPY      INTRACAPSULAR CATARACT EXTRACTION Right 2019    PHACO EMULSIFICATION OF CATARACT WITH INTRAOCULAR LENS IMPLANT EYE performed by Yoan Dunne MD at [facility]    OTHER SURGICAL HISTORY      Diabetic retinal exam     Allergies   Allergen Reactions    Dairycare [Bacid]     Lisinopril      cough       DATE ONSET: 2025    Date of Evaluation: 2025   Evaluating Therapist: TINO Mata    Chart Reviewed: : [x] Yes [] No     Current Diet: Diet NPO    Medical record review/interview: Per MD H&P on 2025:   \" Yrn VALE Martinez

## 2025-02-26 NOTE — PROGRESS NOTES
[facility]    Pharmacy Progress Note    Vancomycin - Management by Pharmacy    Consult Date(s): 2/26/25  Consulting Provider(s): Dr. Sahu    Assessment / Plan  Sepsis of Unknown Etiology - Vancomycin  Concurrent Antimicrobials: Cefepime  Day of Vanc Therapy / Ordered Duration: Day 1 of 7  Current Dosing Method: Intermittent Dosing by Levels  Therapeutic Goal: Trough ~ 15 mg/L  Current Dose / Plan:   ABDELRAHMAN on admission - Scr 3.1 mg/dL  Received a one time dose of vancomycin 1500 mg this morning  Will check a vancomycin random level tomorrow morning  Will continue to monitor clinical condition and make adjustments to regimen as appropriate.    Thank you for consulting pharmacy,    Marina Hernandez, PharmD  Main Pharmacy = x [phone]      Subjective/Objective:   Yrn Martinez is a 84 y.o. male who is admitted with concern for sepsis.     Pharmacy is consulted to dose vancomycin.    Ht Readings from Last 1 Encounters:   02/26/25 1.829 m (6')     Wt Readings from Last 1 Encounters:   02/26/25 81.9 kg (180 lb 8.9 oz)     Current & Prior Antimicrobial Regimen(s):  Cefepime  Vancomycin    Vancomycin Level(s) / Doses:    Date Time Dose Type of Level / Level Interpretation                 Note: Serum levels collected for AUC-based dosing may be high if collected in close proximity to the dose administered. This is not necessarily indicative of toxicity.    Cultures & Sensitivities:    2/26/25: Blood cx x 2: In process    Recent Labs     02/25/25  1802 02/26/25  0455   CREATININE 3.1* 3.1*   BUN 26* 33*   WBC 17.8* 13.3*       Estimated Creatinine Clearance: 19 mL/min (A) (based on SCr of 3.1 mg/dL (H)).    Additional Lab Values / Findings of Note:    No results for input(s): \"PROCAL\" in the last 72 hours.

## 2025-02-26 NOTE — ED PROVIDER NOTES
will be admitted for further evaluation and treatment.  He is currently in the emergency room pending transfer to [facility].       DDX-rhabdomyolysis, ABDELRAHMAN, sepsis  Social Determinants (Poverty, Education, uninsured) -  Prior notes-PMD notes reviewed  Name and route all medications-see meds section above  Charting interpretations all by myself  Diagnosis descriptions-moderate  Consults- None  Disposition- Considered - admission          I PERSONALLY SAW THE PATIENT AND PERFORMED A SUBSTANTIVE PORTION OF THE VISIT INCLUDING ALL ASPECTS OF THE MEDICAL DECISION MAKING PROCESS.     The primary clinician of record Shantell Nash MD    ClINICAL IMPRESSION:  1. ABDELRAHMAN (acute kidney injury)    2. Traumatic rhabdomyolysis, initial encounter    3. Sepsis, due to unspecified organism, unspecified whether acute organ dysfunction present (HCC)          PDISPOSITION Decision To Transfer 02/25/2025 10:20:59 PM   DISPOSITION CONDITION Stable         ______________________________________________________________________  CRITICAL CARE NOTE:  There was a high probability of clinically significant life-threatening deterioration of the patient's condition requiring my urgent intervention.     This includes multiple reevaluations, vital sign monitoring, pulse oximetry monitoring, telemetry monitoring, clinical response to the IV medications, reviewing the nursing notes, consultation time, dictation/documentation time, and interpretation of the labwork. (This time excludes time spent performing procedures).     I personally saw the patient and independently provided 36 minutes of non-concurrent critical care out of the total shared critical care time provided.    _____________________________________________________________________________________________  This record is transcribed utilizing voice recognition technology. There are inherent limitations in this technology. In addition, there may be limitations in editing of this

## 2025-02-26 NOTE — CONSULTS
Pharmacy Note  Vancomycin Consult    Yrn Martinez is a 84 y.o. male started on Vancomycin for Sepsis; consult received from Dr. Sahu to manage therapy. Also receiving the following antibiotics: Cefepime.    Allergies:  Dairycare [bacid] and Lisinopril     Tmax:     Micro:     Recent Labs     02/25/25  1802   CREATININE 3.1*       Recent Labs     02/25/25  1802 02/26/25  0455   WBC 17.8* 13.3*       Estimated Creatinine Clearance: 19 mL/min (A) (based on SCr of 3.1 mg/dL (H)).    No intake or output data in the 24 hours ending 02/26/25 0516    Wt Readings from Last 1 Encounters:   02/26/25 81.9 kg (180 lb 8.9 oz)         Body mass index is 24.49 kg/m².    Loading dose (critically ill or in ICU, require dialysis or renal replacement therapy): Vancomycin 25 mg/kg IVPB x 1 (maximum 2500 mg).  Maintenance dose: 10-20 mg/kg (maximum: 2000 mg/dose and 4500 mg/day) starting at the next dosing interval determined by renal function  Pulse dose: fluctuating renal function, ABDELRAHMAN, ESRD   Goal Vancomycin trough: 15-20 mcg/mL   Goal Vancomycin AUC: 400-600     Assessment/Plan:  Will initiate Vancomycin with a one time loading dose of 1500 mg x1. Based on the patient age and renal function, will pulse dose patient to maintain vancomycin levels > 15 mcg/mL. Vancomycin level ordered for 2/26 at 1500. Timing of trough level will be determined based on culture results, renal function, and clinical response.     Thank you for the consult.  Homero Leija, PharmD  2/26/2025 5:17 AM

## 2025-02-26 NOTE — PROGRESS NOTES
RN contacted Dr. Oliveira to see if it would be okay to order a picc line for this patient for lab draws and iv atb d/t pt pulling out iv and continuing to pull at other lines. Dr. Oliveira said to check with Nephrology. RN contacted Dr. Moran (Nephro) who was covering for Dr. Dillon. Message was forwarded to Dr. Dillon. RN awaiting response.

## 2025-02-26 NOTE — PROGRESS NOTES
C ordered d/t patient becoming restless, attempting to get out of chair, taking off clothes, tele, and pulling at his iv line despite redirection multiple times. Pt transferred from chair to bed, and now resting with bed locked in lowest position with bed alarm on and call light in reach.

## 2025-02-26 NOTE — PROGRESS NOTES
to be seen after this session, please let this note serve as discharge summary.  Please see case management note for discharge disposition.  Thank you.    Assessment/Clinical Assessment:   Pt seen today for physical therapy Evaluation & Treatment.   Pt demonstrated decreased Activity tolerance, Functional Mobility , Balance, and Strengthas well as decreased independence with Ambulation, Bed Mobility , and Transfers.   Most likely recommend SNF, but pending inpt progress. Pt very confused and agitated wanting to sleep; unable to follow simple commands and BLE needing significant A for ROM.    Clinical Assessment:   Patient seen today for   [x] Therex completed & addressed:LE AROM and AAROM   [] Theract completed & addressed:   [] Gait completed & addressed:   [] NM re-education completed & addressed:    Pt very confused/ agitated wanting to sleep; unable to follow simple commands and BLE needing significant A for ROM.Attempted to sit EOB, but pt unwilling to cooperate, stating \"I want to sleep\" Able to do AAROM LE therex, with pt helping after more reps.Most likely recommend SNF, but pending inpt progress. Will re-evaluate as schedule permits.       2nd session: Pt seen for co-eval/treat with PT/OT for cog, endurance, x2 A; PT helping with mobility and balance, while OT helping with ADLs. Pt still confused, but less agitated and more cooperative. Able to move slightly better than AM session. Pt requiring max x2 for bed mobility and Mod x2 for transfers and short ambulation to the chair. Recommend SNF      Activity Tolerance:See below.   Impairments Requiring Therapeutic Intervention: decreased functional mobility, decreased ADL status, decreased ROM, decreased strength, decreased cognition, decreased endurance, decreased balance, decreased vision  Rehab Potential:  fair  ___________________________________________________________________________________________________________________________________________  Precautions/Restrictions: high fall risk  Weight Bearing Restrictions:no restrictions  [] Left Upper Extremity  [] Right Upper Extremity   [] Left Lower Extremity  [] Right Lower Extremity     Required Braces/Orthotics: no braces required   [] Left  [] Right  Positional Restrictions:no positional restrictions     Pre-Admission Information   Lives With: spouse  Takes care of WC bound wife      Type of Home:  [facility]  Home Layout: one level  Home Access: level entry  Bathroom Layout: wheelchair accessible  Bathroom Equipment: grab bars in shower, shower chair  Toilet Height: elevated height  Home Equipment: rolling walker, single point cane, manual wheelchair  Transfer Assistance: Independent without use of device  Ambulation Assistance:Independent without use of device  ADL Assistance: independent with all ADL's  IADL Assistance: independent with homemaking tasks  Active :        [x] Yes  [] No  Current Employment: retired   Recent Falls: admission fall- denies prior falls    Examination   Vision:   Vision Corrective Device: wears glasses for reading  Hearing:   hard of hearing  Observation:   General Observation:  IV, telemetry   Posture:   Unable to formally assess as pt supine  Sensation:   denies numbness and tingling  Proprioception: [x]  intact BLE hallux     Tone: Unable to formally assess; 2/2 agitated [] normotonic BLE         Coordination Testing:   Unable to formally test secondary to Agitation    ROM:   (B) LE AROM WFL  Strength:   Formal MMT held secondary to Agitation    Therapist Clinical Decision Making (Complexity): medium complexity  Clinical Presentation: stable    Cognition  Overall Cognitive Status: Impaired  Orientation:    oriented to person, disoriented to place, disoriented to time , and disoriented to situation  Command

## 2025-02-26 NOTE — CARE COORDINATION
Case Management Assessment  Initial Evaluation    Date/Time of Evaluation: 2/26/2025 9:46 AM  Assessment Completed by: ANAM Person    If patient is discharged prior to next notation, then this note serves as note for discharge by case management.    Patient Name: Yrn aMrtinez                   YOB: 1940  Diagnosis: Rhabdomyolysis [M62.82]                   Date / Time: 2/26/2025  2:35 AM    Patient Admission Status: Inpatient   Readmission Risk (Low < 19, Mod (19-27), High > 27): Readmission Risk Score: 12.5    Current PCP: Vicky Nowak, DO  PCP verified by CM? Yes    Chart Reviewed: Yes      History Provided by: Child/Family  Patient Orientation: Other (see comment) (confused)    Patient Cognition: Other (see comment) (confused)    Hospitalization in the last 30 days (Readmission):  No    If yes, Readmission Assessment in CM Navigator will be completed.    Advance Directives:      Code Status: Full Code   Patient's Primary Decision Maker is: Legal Next of Kin      Discharge Planning:    Patient lives with: Spouse/Significant Other Type of Home: Skilled Nursing Facility  Primary Care Giver: Family  Patient Support Systems include: Children   Current Financial resources: Medicare  Current community resources: ECF/Home Care  Current services prior to admission: None, Durable Medical Equipment            Current DME: Walker            Type of Home Care services:  None    ADLS  Prior functional level: Independent in ADLs/IADLs  Current functional level: Assistance with the following:, Bathing, Dressing, Toileting, Cooking, Housework, Shopping, Mobility    PT AM-PAC: 6 /24  OT AM-PAC:   /24    Family can provide assistance at DC: No  Would you like Case Management to discuss the discharge plan with any other family members/significant others, and if so, who? No  Plans to Return to Present Housing: Unknown at present  Other Identified Issues/Barriers to RETURNING to current housing:

## 2025-02-26 NOTE — CONSULTS
Vancomycin has been discontinued. Pharmacy will sign off consult. If medication dosing is resumed, please re-consult pharmacy.    Alex Lee PharmD  [facility]   [id number]  2/26/2025   3:46 PM

## 2025-02-26 NOTE — PROGRESS NOTES
Patient seen and examined at bed side        ABDELRAHMAN    Possible sepsis    Elevated troponin    Rhabdomyolysis    H/o Afib    Plan    Cont IVF      ABDELRAHMAN    IVF  Moniter BMP  Avoid NSAIDs  Avoid contrast  Avoid ACEI or ARB  Keep MAP > 65 mmhg    Consult Nephrology    Elevated trop : could be 2/2 ABDELRAHMAN , sepsis     Will moniter for now    Moniter CPK    Get BMP , trop lab

## 2025-02-26 NOTE — H&P
History and Physical      Name:  Yrn Martinez /Age/Sex: 1940  (84 y.o. male)   MRN & CSN:  [id number] & [id number] Encounter Date/Time: 2025 2:31 AM EST   Location:  [facility]/3218-01 PCP: Vicky Nowak DO         Date of Exam: 2025       Chief Complaint:     Transferred from [place] emergency department on account of ABDELRAHMAN & rhabdomyolysis      Assessment/Plan:     Sepsis: Tachycardia + leukocytosis + ABDELRAHMAN + lactic acidosis.  Blood cultures obtained in the emergency department, received Rocephin.  Only received 500 cc of IV fluids on account of elevated BNP.  UA pending at time of writing.  Proceed with vancomycin + Zosyn.  Monitor closely    Rhabdomyolysis: Was found down after unknown period of time, CK 2455.  Initiate IV NS 75 cc/h x 24 hours.  Follow CK daily.    Suspected ground-level fall: Unclear if/how patient fell.  CT cervical spine without osseous abnormalities.  PT OT to evaluate.  Analgesia as needed.  Patient is said to live with wife who is wheelchair-bound; he is a caregiver    ABDELRAHMAN: SCR 3.1 at time of presentation, baseline unknown however was 1.4 on 2024.  IV fluids as outlined above.  Avoid nephrotoxins, renally dose medications, monitor I's and O's.    Elevated BNP: Patient clinically euvolemic, BNP levels likely secondary to ABDELRAHMAN outlined above    Paroxysmal atrial fibrillation: , does not appear to be on anticoagulation or rate control on an outpatient basis.  Continuous cardiac monitoring    Hypertension: Initially normotensive but up to 170s/80s in the emergency department.  Monitor closely, provide labetalol for exacerbations -- not on antihypertensives prior to admission    QT elongation: QTc 554, avoid QT prolonging medications, continuous cardiac monitoring as outlined above    BEBO: CPAP per home settings    T2DM: Check A1c.  BG control via low-dose SSI regimen, hypoglycemia precautions in place    Medical history also includes sick sinus syndrome, LBBB &

## 2025-02-26 NOTE — PLAN OF CARE
Problem: SLP Adult - Impaired Swallowing  Goal: By Discharge: Advance to least restrictive diet without signs or symptoms of aspiration for planned discharge setting.  See evaluation for individualized goals.  Outcome: Progressing     SLP completed evaluation. Please refer to notes in EMR.     Rand Marie M.A., CCC-SLP  Speech-Language Pathologist  [phone]

## 2025-02-26 NOTE — CONSULTS
Nephrology Consult Note                                                                                                                                                                                                                                                                                                                                                               Office : [phone]     Fax :[phone]    Patient's Name: Yrn Martinez  3:10 PM  2/26/2025    Reason for Consult:  ABDELRAHMAN on CKD 3  Requesting Physician:  Vicky Nowak DO  Chief Complaint:  No chief complaint on file.      Assessment/Plan     # ABDELRAHMAN on CKD 3  - ABDELRAHMAN likely 2/2 obstruction and rhabdomyolysis  - He is retaining urine and nursing staff is having hard time doing straight cath, they are going to try Wolff catheter   - continue gentle IVF  - Baseline Cr ~ 1.2  - Hx of albuminuria ~ 500 mg  - Avoid nephrotoxins  - Monitor renal labs     # Rhabdomyolysis  - CK ~ 1.8 k, trending down   - continue IVF  - monitor CK level    #Sepsis  #Lactic Acidosis  - IV abx per primary  - blood cultures pending     # DM 2  - Last A1C 8 in 08/2024  - management per primary    # Afib    #HTN  - PRN labetolol  - not on anti-hypertensive's at home  - monitor    History of Present Ilness:    Yrn Martinez is a 84 y.o. male with past medical history of paroxysmal atrial fibrillation, hypertension, BEBO, sick sinus syndrome, left bundle branch block, T2DM, hyperlipidemia and CKD.  He presented to the emergency department via EMS after being found down by his wife after an unknown amount of time.  Patient lethargic and not able to provide a history to this writer.     Upon arrival at the emergency department, his heart rate was 106 bpm with initial blood pressure 115/75 mmHg but later escalating to 170s/80s.  WBC 17.8, , BUN/creatinine 26/3.1, rest of CBC and CMP reassuring.  PT/INR unremarkable. Initial troponin 95, BNP 3139.  Initial lactate was 2.7.  CK  72 hours.    Invalid input(s): \"LDLCALC\", \"LABVLDL\"  ABGs: No results for input(s): \"PHART\", \"PO2ART\", \"PFB3FDI\" in the last 72 hours.  INR:   Recent Labs     02/25/25 1802   INR 1.16*     UA:No results for input(s): \"COLORU\", \"CLARITYU\", \"GLUCOSEU\", \"BILIRUBINUR\", \"KETUA\", \"SPECGRAV\", \"BLOODU\", \"PHUR\", \"PROTEINU\", \"UROBILINOGEN\", \"NITRU\", \"LEUKOCYTESUR\", \"URINETYPE\" in the last 72 hours.    Invalid input(s): \"LABMICR\"   Urine Microscopic: No results for input(s): \"LABCAST\", \"BACTERIA\", \"COMU\", \"HYALCAST\", \"WBCUA\", \"RBCUA\" in the last 72 hours.    Invalid input(s): \"EPIU\"  Urine Culture: No results for input(s): \"LABURIN\" in the last 72 hours.  Urine Chemistry: No results for input(s): \"CLUR\", \"LABCREA\", \"PROTEINUR\", \"NAUR\" in the last 72 hours.      IMAGING:  No orders to display         Medical Decision Making:  The following items were considered in medical decision making:  Discussion of patient care with other providers  Reviewed clinical lab tests  Reviewed radiology tests  Reviewed other diagnostic tests/interventions    Will be discussed w/  Primary team     Thank you for allowing us to participate in care of Yrn Martinez   Feel free to contact me,     Diana Bhatia, APRN - CNP   [facility]  Office : [phone] or through [employer]  Fax :[phone]

## 2025-02-26 NOTE — PLAN OF CARE
Problem: Occupational Therapy - Adult  Goal: By Discharge: Performs self-care activities at highest level of function for planned discharge setting.  See evaluation for individualized goals.  Outcome: Progressing   Aydee Ramos OTR

## 2025-02-26 NOTE — PROGRESS NOTES
Mod I for improved ADL completion    Above goals reviewed on 2/26/2025.  All goals are ongoing at this time unless indicated above.       Therapy Session Time     Individual Group Co-treatment   Time In 1109      Time Out 1133      Minutes 24           Timed Code Treatment Minutes: 9 Minutes (15 min eval; 9 min tx)  Total Evaluation Minutes: 15  Total Treatment Minutes:  9       If co-treatement indicated, patient seen for co-treatment due to: requirement of two skilled therapists, patient safety, and/or cognitive status.      Electronically Signed By: Aydee Ramos OT

## 2025-02-26 NOTE — ED NOTES
Report to be called to KELLY Rapp, who was unavailable at this time. Facility number left with KELLY Rocha for return call.

## 2025-02-27 LAB
ALBUMIN SERPL-MCNC: 3.3 G/DL (ref 3.4–5)
ALBUMIN/GLOB SERPL: 0.8 {RATIO}
ALP SERPL-CCNC: 70 U/L (ref 40–129)
ALT SERPL-CCNC: 28 U/L (ref 10–40)
ANION GAP SERPL CALCULATED.3IONS-SCNC: 15 MMOL/L (ref 3–16)
AST SERPL-CCNC: 54 U/L (ref 15–37)
BACTERIA URNS QL MICRO: ABNORMAL
BASOPHILS # BLD: 0.03 K/UL (ref 0–0.2)
BASOPHILS NFR BLD: 0 %
BILIRUB SERPL-MCNC: 0.5 MG/DL (ref 0–1)
BILIRUB UR QL STRIP: NEGATIVE
BUN SERPL-MCNC: 34 MG/DL (ref 7–20)
CALCIUM SERPL-MCNC: 8.9 MG/DL (ref 8.3–10.6)
CHARACTER UR: ABNORMAL
CHLORIDE SERPL-SCNC: 100 MMOL/L (ref 99–110)
CK SERPL-CCNC: 782 U/L (ref 39–308)
CLARITY UR: CLEAR
CO2 SERPL-SCNC: 21 MMOL/L (ref 21–32)
COLOR UR: YELLOW
CREAT SERPL-MCNC: 2 MG/DL (ref 0.8–1.3)
CRYSTALS URNS MICRO: ABNORMAL /HPF
EOSINOPHIL # BLD: 0.22 K/UL (ref 0–0.6)
EOSINOPHILS RELATIVE PERCENT: 2 %
EPI CELLS #/AREA URNS HPF: ABNORMAL /HPF
ERYTHROCYTE [DISTWIDTH] IN BLOOD BY AUTOMATED COUNT: 12.5 % (ref 12.4–15.4)
GFR, ESTIMATED: 33 ML/MIN/1.73M2
GLUCOSE BLD-MCNC: 145 MG/DL (ref 70–99)
GLUCOSE BLD-MCNC: 169 MG/DL (ref 70–99)
GLUCOSE BLD-MCNC: 186 MG/DL (ref 70–99)
GLUCOSE BLD-MCNC: 271 MG/DL (ref 70–99)
GLUCOSE SERPL-MCNC: 167 MG/DL (ref 70–99)
GLUCOSE UR STRIP-MCNC: NEGATIVE MG/DL
HCT VFR BLD AUTO: 35 % (ref 40.5–52.5)
HGB BLD-MCNC: 11.8 G/DL (ref 13.5–17.5)
HGB UR QL STRIP.AUTO: ABNORMAL
IMM GRANULOCYTES # BLD AUTO: 0.07 K/UL (ref 0–0.5)
IMM GRANULOCYTES NFR BLD: 1 %
KETONES UR STRIP-MCNC: NEGATIVE MG/DL
LEUKOCYTE ESTERASE UR QL STRIP: ABNORMAL
LYMPHOCYTES NFR BLD: 1.7 K/UL (ref 1–5.1)
LYMPHOCYTES RELATIVE PERCENT: 17 %
MCH RBC QN AUTO: 29.9 PG (ref 26–34)
MCHC RBC AUTO-ENTMCNC: 33.7 G/DL (ref 31–36)
MCV RBC AUTO: 88.6 FL (ref 80–100)
MONOCYTES NFR BLD: 0.68 K/UL (ref 0–1.3)
MONOCYTES NFR BLD: 7 %
NEUTROPHILS NFR BLD: 73 %
NEUTS SEG NFR BLD: 7.32 K/UL (ref 1.7–7.7)
NITRITE UR QL STRIP: NEGATIVE
PH UR STRIP: 6 [PH] (ref 5–8)
PLATELET # BLD AUTO: 179 K/UL (ref 135–450)
PMV BLD AUTO: 10.4 FL (ref 9.4–12.4)
POTASSIUM SERPL-SCNC: 4.1 MMOL/L (ref 3.5–5.1)
PROT SERPL-MCNC: 7.2 G/DL (ref 6.4–8.2)
PROT UR STRIP-MCNC: 100 MG/DL
RBC # BLD AUTO: 3.95 M/UL (ref 4.2–5.9)
RBC #/AREA URNS HPF: ABNORMAL /HPF
SODIUM SERPL-SCNC: 135 MMOL/L (ref 136–145)
SP GR UR STRIP: >1.03 (ref 1–1.03)
UROBILINOGEN UR STRIP-ACNC: 0.2 EU/DL (ref 0–1)
WBC #/AREA URNS HPF: ABNORMAL /HPF
WBC OTHER # BLD: 10 K/UL (ref 4–11)

## 2025-02-27 PROCEDURE — 83036 HEMOGLOBIN GLYCOSYLATED A1C: CPT

## 2025-02-27 PROCEDURE — 99233 SBSQ HOSP IP/OBS HIGH 50: CPT | Performed by: HOSPITALIST

## 2025-02-27 PROCEDURE — 80053 COMPREHEN METABOLIC PANEL: CPT

## 2025-02-27 PROCEDURE — 97110 THERAPEUTIC EXERCISES: CPT

## 2025-02-27 PROCEDURE — 97530 THERAPEUTIC ACTIVITIES: CPT

## 2025-02-27 PROCEDURE — 6370000000 HC RX 637 (ALT 250 FOR IP): Performed by: HOSPITALIST

## 2025-02-27 PROCEDURE — 2580000003 HC RX 258: Performed by: STUDENT IN AN ORGANIZED HEALTH CARE EDUCATION/TRAINING PROGRAM

## 2025-02-27 PROCEDURE — 6360000002 HC RX W HCPCS: Performed by: STUDENT IN AN ORGANIZED HEALTH CARE EDUCATION/TRAINING PROGRAM

## 2025-02-27 PROCEDURE — 6370000000 HC RX 637 (ALT 250 FOR IP): Performed by: INTERNAL MEDICINE

## 2025-02-27 PROCEDURE — 6360000002 HC RX W HCPCS: Performed by: HOSPITALIST

## 2025-02-27 PROCEDURE — 2060000000 HC ICU INTERMEDIATE R&B

## 2025-02-27 PROCEDURE — 82962 GLUCOSE BLOOD TEST: CPT

## 2025-02-27 PROCEDURE — 92526 ORAL FUNCTION THERAPY: CPT

## 2025-02-27 PROCEDURE — 6370000000 HC RX 637 (ALT 250 FOR IP): Performed by: STUDENT IN AN ORGANIZED HEALTH CARE EDUCATION/TRAINING PROGRAM

## 2025-02-27 PROCEDURE — 82550 ASSAY OF CK (CPK): CPT

## 2025-02-27 PROCEDURE — 85025 COMPLETE CBC W/AUTO DIFF WBC: CPT

## 2025-02-27 PROCEDURE — 2500000003 HC RX 250 WO HCPCS: Performed by: STUDENT IN AN ORGANIZED HEALTH CARE EDUCATION/TRAINING PROGRAM

## 2025-02-27 PROCEDURE — 36415 COLL VENOUS BLD VENIPUNCTURE: CPT

## 2025-02-27 PROCEDURE — 2580000003 HC RX 258: Performed by: INTERNAL MEDICINE

## 2025-02-27 RX ORDER — ATORVASTATIN CALCIUM 10 MG/1
10 TABLET, FILM COATED ORAL DAILY
Status: DISCONTINUED | OUTPATIENT
Start: 2025-02-27 | End: 2025-03-01 | Stop reason: HOSPADM

## 2025-02-27 RX ORDER — SODIUM CHLORIDE 9 MG/ML
INJECTION, SOLUTION INTRAVENOUS CONTINUOUS
Status: DISCONTINUED | OUTPATIENT
Start: 2025-02-27 | End: 2025-02-28

## 2025-02-27 RX ORDER — AMLODIPINE BESYLATE 5 MG/1
10 TABLET ORAL DAILY
Status: DISCONTINUED | OUTPATIENT
Start: 2025-02-27 | End: 2025-03-01 | Stop reason: HOSPADM

## 2025-02-27 RX ORDER — TAMSULOSIN HYDROCHLORIDE 0.4 MG/1
0.4 CAPSULE ORAL DAILY
Status: DISCONTINUED | OUTPATIENT
Start: 2025-02-27 | End: 2025-03-01 | Stop reason: HOSPADM

## 2025-02-27 RX ADMIN — INSULIN LISPRO 1 UNITS: 100 INJECTION, SOLUTION INTRAVENOUS; SUBCUTANEOUS at 21:07

## 2025-02-27 RX ADMIN — ATORVASTATIN CALCIUM 10 MG: 10 TABLET, FILM COATED ORAL at 18:48

## 2025-02-27 RX ADMIN — AMLODIPINE BESYLATE 10 MG: 5 TABLET ORAL at 18:48

## 2025-02-27 RX ADMIN — ENOXAPARIN SODIUM 30 MG: 100 INJECTION SUBCUTANEOUS at 08:30

## 2025-02-27 RX ADMIN — INSULIN LISPRO 2 UNITS: 100 INJECTION, SOLUTION INTRAVENOUS; SUBCUTANEOUS at 13:01

## 2025-02-27 RX ADMIN — ASPIRIN 81 MG: 81 TABLET, CHEWABLE ORAL at 08:30

## 2025-02-27 RX ADMIN — LINEZOLID 600 MG: 600 INJECTION, SOLUTION INTRAVENOUS at 10:29

## 2025-02-27 RX ADMIN — LINEZOLID 600 MG: 600 INJECTION, SOLUTION INTRAVENOUS at 21:07

## 2025-02-27 RX ADMIN — TAMSULOSIN HYDROCHLORIDE 0.4 MG: 0.4 CAPSULE ORAL at 13:01

## 2025-02-27 RX ADMIN — SODIUM CHLORIDE, PRESERVATIVE FREE 10 ML: 5 INJECTION INTRAVENOUS at 19:49

## 2025-02-27 RX ADMIN — CEFEPIME 1000 MG: 1 INJECTION, POWDER, FOR SOLUTION INTRAMUSCULAR; INTRAVENOUS at 08:27

## 2025-02-27 RX ADMIN — CEFEPIME 1000 MG: 1 INJECTION, POWDER, FOR SOLUTION INTRAMUSCULAR; INTRAVENOUS at 22:37

## 2025-02-27 RX ADMIN — SODIUM CHLORIDE: 0.9 INJECTION, SOLUTION INTRAVENOUS at 19:45

## 2025-02-27 RX ADMIN — SODIUM CHLORIDE, PRESERVATIVE FREE 10 ML: 5 INJECTION INTRAVENOUS at 08:30

## 2025-02-27 ASSESSMENT — PAIN SCALES - GENERAL
PAINLEVEL_OUTOF10: 0

## 2025-02-27 NOTE — CARE COORDINATION
Discharge Planning Note Re: Skilled Nursing     Spoke with Juan Pablo at [facility] and they can accept pt. Pt must be sitter free for 24 hours including camera.

## 2025-02-27 NOTE — PROGRESS NOTES
[facility] Progress Note      Name:  Yrn Martinez /Age/Sex: 1940  (84 y.o. male)   MRN & CSN:  [id number] & [id number] Encounter Date/Time: 2025 6:11 PM EST   Location:  3218/3218-01 PCP: Vicky Nowak DO     Attending:Cori Steel MD       Hospital Day: 2          Impression   Yrn Martinez is a 84 y.o. male with known history of hypertension, diabetes mellitus type 2, dementia and obstructive sleep apnea who lives with his wife at home was brought to the ED by EMS after he could not get up.  Workup in ED revealed CK of more than 2500 consistent with rhabdomyolysis as well as an evidence of ABDELRAHMAN.  CT head was nonacute.  Patient also met SIRS criteria.  He was admitted for further evaluation and management of rhabdo, ABDELRAHMAN and sepsis.    Assessment and Plan   Assessment:  # Rhabdomyolysis.  Initial CK around 2500.  CK down to 780.  # Urinary retention status post Wolff.  Patient had straight cath x 3, but unsuccessful to void on his own. Indwelling Wolff catheter was placed.  # Acute kidney injury, likely postrenal as well secondary to rhabdo. Improving with IV fluids.  # SIRS criteria, tachycardia, leukocytosis, lactic acidosis.  Temperature 100.8 on .  No clear source.  Leukocytosis resolved.  Blood cultures, UA and chest x-ray unrevealing.  # Paroxysmal atrial fibrillation. It seems that patient was on Coumadin but it was last filled 2024 (medication dispense history).  He is on digoxin and he has filled that recently.  Likely the patient was taken off Coumadin due to risk of falls.  # Essential hypertension.  # Obstructive sleep apnea on CPAP.  # Diabetes mellitus type 2, no recent A1c.  # Dementia.    Plan:  # Appreciate nephrology consult.  Continue gentle IV fluids hydration ,normal saline 75 cc an hour.  # Monitor renal function  # Likely DC broad-spectrum antibiotics tomorrow if blood cultures remain negative and no detectable source.  Currently on Zyvox and  Daily    linezolid  600 mg IntraVENous Q12H      Infusions:    sodium chloride      dextrose       PRN Meds: sodium chloride flush, 5-40 mL, PRN  sodium chloride, , PRN  ondansetron, 4 mg, Q8H PRN   Or  ondansetron, 4 mg, Q6H PRN  polyethylene glycol, 17 g, Daily PRN  acetaminophen, 650 mg, Q6H PRN   Or  acetaminophen, 650 mg, Q6H PRN  glucose, 4 tablet, PRN  dextrose bolus, 125 mL, PRN   Or  dextrose bolus, 250 mL, PRN  glucagon (rDNA), 1 mg, PRN  dextrose, , Continuous PRN  labetalol, 10 mg, Q6H PRN  oxyCODONE, 5 mg, Q4H PRN   Or  oxyCODONE, 10 mg, Q4H PRN        Labs and Imaging   Personally reviewed Lab Studies and Imaging       Electronically signed by Cori Steel MD on 2/27/2025 at 6:11 PM

## 2025-02-27 NOTE — PROGRESS NOTES
Speech Language Pathology  Clinical Bedside Treatment Note  Facility/Department: [facility] PROGRESSIVE CARE    Recommendations:   Diet recommendation: IDDSI 5 Minced and moist Solids; IDDSI 0 Thin Liquids; Meds PO as tolerated  Instrumentation: Not clinically indicated at this time. Will continue to monitor  Risk management: upright for all intake, stay upright for at least 30 mins after intake, small bites/sips, 1:1 supervision with intake, oral care 2-3x/day to reduce adverse affects in the event of aspiration, increase physical mobility as able, alternate bites/sips, slow rate of intake, general GERD precautions, STRICT aspiration precautions, and hold PO and contact SLP if s/s of aspiration or worsening respiratory status develop.    NAME:Yrn Martinez  : 1940 (84 y.o.)   MRN: [id number]  ROOM: [address] [place] [address]  ADMISSION DATE: 2025  PATIENT DIAGNOSIS(ES): Rhabdomyolysis [M62.82]  No chief complaint on file.    Patient Active Problem List    Diagnosis Date Noted    Rhabdomyolysis 2025     Past Medical History:   Diagnosis Date    Atrial fibrillation (HCC)     Diabetes mellitus (HCC)     Hyperlipidemia     Hypertension     Rosacea     Sleep apnea     SSS (sick sinus syndrome) ([facility])          Past Surgical History:   Procedure Laterality Date    CARDIOVERSION  2017    CATARACT REMOVAL WITH IMPLANT Right 2019    COLONOSCOPY      INTRACAPSULAR CATARACT EXTRACTION Right 2019    PHACO EMULSIFICATION OF CATARACT WITH INTRAOCULAR LENS IMPLANT EYE performed by Yoan Dunne MD at [facility] OR    OTHER SURGICAL HISTORY      Diabetic retinal exam     Allergies   Allergen Reactions    Dairycare [Bacid]     Lisinopril      cough       DATE ONSET: 2025    Date of Evaluation: 2025   Evaluating Therapist: TINO Mata    Chart Reviewed: : [x] Yes [] No     Current Diet: ADULT DIET; Dysphagia - Minced and Moist; 5 carb choices (75 gm/meal)    Medical record review/interview: Per MD

## 2025-02-27 NOTE — PROGRESS NOTES
02/27/25 1532   Encounter Summary   Encounter Overview/Reason Attempted Encounter   Service Provided For Patient not available   Referral/Consult From Rounding   Last Encounter  02/27/25  (Patient was sleeping/CK)   Assessment/Intervention/Outcome   Assessment Unable to assess

## 2025-02-27 NOTE — PROGRESS NOTES
Physician Progress Note      PATIENT:               VIKI BRIDGES  CSN #:                  [id number]  :                       1940  ADMIT DATE:       2025 2:35 AM  DISCH DATE:  RESPONDING  PROVIDER #:        Cori Steel MD          QUERY TEXT:    Dear Dr. Steel,  Pt admitted with Sepsis, ABDELRAHMAN.  Pt noted to have rhabdomyolysis with RD=[id number]   and noted \"found down\".  If possible, please document in progress notes and   discharge summary if you are evaluating and/or treating any of the following:    The medical record reflects the following:  Risk Factors: Hx A-fib, DM, HTN, HLD, sleep apnea, wheelchair bound, current   s/p fall and found down  Clinical Indicators: H and P notes \"Rhabdomyolysis: Was found down after   unknown period of time, CK 2455\"  Treatment: IV NS 75 cc/h x 24 hours.  Follow CK daily.  Thank you,  Saniya Dewitt RN, CDS    Per [email or web address]  Traumatic rhabdomyolysis cause examples: crush syndrome, prolonged   immobilization  Nontraumatic rhabdomyolysis cause examples:  marked exertion, hyperthermia,   metabolic myopathy, drugs or toxins, infections, electrolyte disorders.  Options provided:  -- Traumatic rhabdomyolysis  -- Nontraumatic rhabdomyolysis  -- Other - I will add my own diagnosis  -- Disagree - Not applicable / Not valid  -- Disagree - Clinically unable to determine / Unknown  -- Refer to Clinical Documentation Reviewer    PROVIDER RESPONSE TEXT:    This patient has traumatic rhabdomyolysis.    Query created by: Saniya Whittington on 2025 2:18 PM      QUERY TEXT:    Dear Dr. Steel,  Pt admitted with Sepsis, rhabdomyolysis, ABDELRAHMAN, lactic acidosis. Pt noted to be   \"lethargic and oriented to self only, following some commands\" . If possible,   please document in the progress notes and discharge summary if you are   evaluating and / or treating any of the following:    The medical record reflects the

## 2025-02-27 NOTE — WOUND CARE
Wound care consult received for rash to back/buttocks.   Pt has a history of plaque psoriasis last seen 6/2023 by dermatology at [facility] . At that time it was noted he has psoriasis extending from his mid back to buttocks and right lower thorax. He opted for topical treatment over systemic treatment. He also declined phototherapy. He was prescribed the following medications at that time:  -Halobetasol cream twice daily to 3 times daily for 2 weeks then switch  back  to 1% triamcinolone cream 3 times daily to 4 times daily.  -Hydroxyzine 25 mg prn itching   I have provided this information to the primary nurse to share with the physician.       Wound care recommendations:  Cleanse with mild soap and water, pat dry.  Petroleum gauze to affected areas-may use petroleum jelly if available.   3.  May attempt to secure with kerlix.   4. Change daily      Pt would benefit from seeing dermatology again as his psoriasis is extensive.     Please contact wound care for questions/concerns.

## 2025-02-27 NOTE — PROGRESS NOTES
[facility] - Rehabilitation Department      Physical Therapy  3218/3218-01  [facility] PROGRESSIVE CARE    [] Initial Evaluation            [x] Daily Treatment Note         [] Discharge Summary      Patient: Yrn Martinez   : 1940   MRN: [id number]   Date of Service:  2025   Admitting Diagnosis: Rhabdomyolysis  Ordering Physician: Sabrina Sahu MD   Current Admission Summary:   \"Transferred from [place] emergency department on account of ABDELRAHMAN & rhabdomyolysis        Assessment/Plan:      Sepsis: Tachycardia + leukocytosis + ABDELRAHMAN + lactic acidosis.  Blood cultures obtained in the emergency department, received Rocephin.  Only received 500 cc of IV fluids on account of elevated BNP.  UA pending at time of writing.  Proceed with vancomycin + Zosyn.  Monitor closely     Rhabdomyolysis: Was found down after unknown period of time, CK 2455.  Initiate IV NS 75 cc/h x 24 hours.  Follow CK daily.     Suspected ground-level fall: Unclear if/how patient fell.  CT cervical spine without osseous abnormalities.  PT OT to evaluate.  Analgesia as needed.  Patient is said to live with wife who is wheelchair-bound; he is a caregiver     ABDELRAHMAN: SCR 3.1 at time of presentation, baseline unknown however was 1.4 on 2024.  IV fluids as outlined above.  Avoid nephrotoxins, renally dose medications, monitor I's and O's.     Elevated BNP: Patient clinically euvolemic, BNP levels likely secondary to ABDELRAHMAN outlined above     Paroxysmal atrial fibrillation: , does not appear to be on anticoagulation or rate control on an outpatient basis.  Continuous cardiac monitoring     Hypertension: Initially normotensive but up to 170s/80s in the emergency department.  Monitor closely, provide labetalol for exacerbations -- not on antihypertensives prior to admission     QT elongation: QTc 554, avoid QT prolonging medications, continuous cardiac monitoring as outlined above     BEBO: CPAP per home settings     T2DM: Check A1c.

## 2025-02-27 NOTE — PLAN OF CARE
Problem: Chronic Conditions and Co-morbidities  Goal: Patient's chronic conditions and co-morbidity symptoms are monitored and maintained or improved  Outcome: Progressing     Problem: Skin/Tissue Integrity  Goal: Skin integrity remains intact  Description: 1.  Monitor for areas of redness and/or skin breakdown  2.  Assess vascular access sites hourly  3.  Every 4-6 hours minimum:  Change oxygen saturation probe site  4.  Every 4-6 hours:  If on nasal continuous positive airway pressure, respiratory therapy assess nares and determine need for appliance change or resting period  Outcome: Progressing     Problem: Safety - Adult  Goal: Free from fall injury  Outcome: Progressing     Problem: Occupational Therapy - Adult  Goal: By Discharge: Performs self-care activities at highest level of function for planned discharge setting.  See evaluation for individualized goals.  2/26/2025 1231 by Aydee Ramos, JAMEEL  Outcome: Progressing

## 2025-02-27 NOTE — PLAN OF CARE
Problem: Chronic Conditions and Co-morbidities  Goal: Patient's chronic conditions and co-morbidity symptoms are monitored and maintained or improved  2/27/2025 1117 by Barb Bates RN  Outcome: Progressing  2/27/2025 0048 by Nona Anderson RN  Outcome: Progressing     Problem: Discharge Planning  Goal: Discharge to home or other facility with appropriate resources  Outcome: Progressing     Problem: Skin/Tissue Integrity  Goal: Skin integrity remains intact  Description: 1.  Monitor for areas of redness and/or skin breakdown  2.  Assess vascular access sites hourly  3.  Every 4-6 hours minimum:  Change oxygen saturation probe site  4.  Every 4-6 hours:  If on nasal continuous positive airway pressure, respiratory therapy assess nares and determine need for appliance change or resting period  2/27/2025 1117 by Barb Bates RN  Outcome: Progressing  2/27/2025 0048 by Nona Anderson RN  Outcome: Progressing     Problem: Safety - Adult  Goal: Free from fall injury  2/27/2025 1117 by Barb Bates RN  Outcome: Progressing  2/27/2025 0048 by Nona Andreson RN  Outcome: Progressing

## 2025-02-27 NOTE — FLOWSHEET NOTE
02/27/25 0715   Vital Signs   Temp 98.1 °F (36.7 °C)   Temp Source Oral   Pulse 88   Heart Rate Source Monitor   Respirations 16   BP (!) 156/73   MAP (Calculated) 101   MAP (mmHg) 73   BP Location Right upper arm   BP Method Automatic   Patient Position Semi fowlers   Pain Assessment   Pain Assessment 0-10   Pain Level 0   Oxygen Therapy   SpO2 99 %   O2 Device None (Room air)     Pt assessment complete; see flow sheets. Vitals completed. Pt alert to self but able to follow commands. Repositioned for comfort. Meds given per MAR. Telesitter remains for safety.    Barb Bates RN

## 2025-02-27 NOTE — PROGRESS NOTES
Dr. Walsh at the bedside and concern of urine retention d/t minimal to no urine output charted since pt being admitted so he requested to do bladder scan and to notify him. 518mL when bladder scanned. Dr. Walsh notified. Tried two times to do a straight catheter but was not successful d/t too much resistances. Placed alvarado. Dr. Quinn aware.

## 2025-02-27 NOTE — CARE COORDINATION
Discharge Planning Note Re: Skilled Nursing     CM/SW noted consult for discharge planning. Chart reviewed. Noted recommendations for SNF.  CM met with patient and  spoke with Daughter per phone.. Introduced self and explained role of CM and discharge planning.    Patient is agreeable to SNF on dc.     [place] of choice list was provided with basic dialogue that supports the patient's individualized plan of care/goals, treatment preferences and shares the quality data associated with the providers. [x] Yes [] No.  Patient and Daughter have reviewed the provided list and made selections.    Referral made to [facility] [place].  Spoke with Juan Pablo.    CM/SW will follow-up on referrals and provide any additional documentation necessary to facilitate placement.

## 2025-02-28 LAB
ALBUMIN: 3.1 G/DL (ref 3.4–5)
ANION GAP SERPL CALCULATED.3IONS-SCNC: 11 MMOL/L (ref 3–16)
BUN SERPL-MCNC: 24 MG/DL (ref 7–20)
CALCIUM SERPL-MCNC: 8.6 MG/DL (ref 8.3–10.6)
CHLORIDE SERPL-SCNC: 102 MMOL/L (ref 99–110)
CK SERPL-CCNC: 253 U/L (ref 39–308)
CO2 SERPL-SCNC: 23 MMOL/L (ref 21–32)
CREAT SERPL-MCNC: 1.2 MG/DL (ref 0.8–1.3)
EST. AVERAGE GLUCOSE BLD GHB EST-MCNC: 186 MG/DL
GFR, ESTIMATED: 62 ML/MIN/1.73M2
GLUCOSE BLD-MCNC: 128 MG/DL (ref 70–99)
GLUCOSE BLD-MCNC: 152 MG/DL (ref 70–99)
GLUCOSE BLD-MCNC: 169 MG/DL (ref 70–99)
GLUCOSE BLD-MCNC: 190 MG/DL (ref 70–99)
GLUCOSE SERPL-MCNC: 128 MG/DL (ref 70–99)
HBA1C MFR BLD: 8.1 %
PHOSPHATE SERPL-MCNC: 3.5 MG/DL (ref 2.5–4.9)
POTASSIUM SERPL-SCNC: 4 MMOL/L (ref 3.5–5.1)
SODIUM SERPL-SCNC: 136 MMOL/L (ref 136–145)

## 2025-02-28 PROCEDURE — 97116 GAIT TRAINING THERAPY: CPT

## 2025-02-28 PROCEDURE — 6370000000 HC RX 637 (ALT 250 FOR IP): Performed by: HOSPITALIST

## 2025-02-28 PROCEDURE — 92526 ORAL FUNCTION THERAPY: CPT

## 2025-02-28 PROCEDURE — 97530 THERAPEUTIC ACTIVITIES: CPT

## 2025-02-28 PROCEDURE — 99233 SBSQ HOSP IP/OBS HIGH 50: CPT | Performed by: HOSPITALIST

## 2025-02-28 PROCEDURE — 97535 SELF CARE MNGMENT TRAINING: CPT

## 2025-02-28 PROCEDURE — 82962 GLUCOSE BLOOD TEST: CPT

## 2025-02-28 PROCEDURE — 2060000000 HC ICU INTERMEDIATE R&B

## 2025-02-28 PROCEDURE — 6370000000 HC RX 637 (ALT 250 FOR IP): Performed by: INTERNAL MEDICINE

## 2025-02-28 PROCEDURE — 6360000002 HC RX W HCPCS: Performed by: HOSPITALIST

## 2025-02-28 PROCEDURE — 80069 RENAL FUNCTION PANEL: CPT

## 2025-02-28 PROCEDURE — 82550 ASSAY OF CK (CPK): CPT

## 2025-02-28 PROCEDURE — 2500000003 HC RX 250 WO HCPCS: Performed by: STUDENT IN AN ORGANIZED HEALTH CARE EDUCATION/TRAINING PROGRAM

## 2025-02-28 PROCEDURE — 2580000003 HC RX 258: Performed by: STUDENT IN AN ORGANIZED HEALTH CARE EDUCATION/TRAINING PROGRAM

## 2025-02-28 PROCEDURE — 2580000003 HC RX 258: Performed by: INTERNAL MEDICINE

## 2025-02-28 PROCEDURE — 36415 COLL VENOUS BLD VENIPUNCTURE: CPT

## 2025-02-28 PROCEDURE — 6360000002 HC RX W HCPCS: Performed by: STUDENT IN AN ORGANIZED HEALTH CARE EDUCATION/TRAINING PROGRAM

## 2025-02-28 RX ORDER — ENOXAPARIN SODIUM 100 MG/ML
40 INJECTION SUBCUTANEOUS DAILY
Status: DISCONTINUED | OUTPATIENT
Start: 2025-03-01 | End: 2025-03-01 | Stop reason: HOSPADM

## 2025-02-28 RX ADMIN — SODIUM CHLORIDE, PRESERVATIVE FREE 10 ML: 5 INJECTION INTRAVENOUS at 08:59

## 2025-02-28 RX ADMIN — ENOXAPARIN SODIUM 30 MG: 100 INJECTION SUBCUTANEOUS at 09:01

## 2025-02-28 RX ADMIN — SODIUM CHLORIDE, PRESERVATIVE FREE 10 ML: 5 INJECTION INTRAVENOUS at 20:53

## 2025-02-28 RX ADMIN — LINEZOLID 600 MG: 600 INJECTION, SOLUTION INTRAVENOUS at 09:05

## 2025-02-28 RX ADMIN — ATORVASTATIN CALCIUM 10 MG: 10 TABLET, FILM COATED ORAL at 08:58

## 2025-02-28 RX ADMIN — TAMSULOSIN HYDROCHLORIDE 0.4 MG: 0.4 CAPSULE ORAL at 08:58

## 2025-02-28 RX ADMIN — SODIUM CHLORIDE: 0.9 INJECTION, SOLUTION INTRAVENOUS at 10:42

## 2025-02-28 RX ADMIN — ASPIRIN 81 MG: 81 TABLET, CHEWABLE ORAL at 08:58

## 2025-02-28 RX ADMIN — CEFEPIME 1000 MG: 1 INJECTION, POWDER, FOR SOLUTION INTRAMUSCULAR; INTRAVENOUS at 08:56

## 2025-02-28 RX ADMIN — AMLODIPINE BESYLATE 10 MG: 5 TABLET ORAL at 08:58

## 2025-02-28 ASSESSMENT — PAIN SCALES - GENERAL
PAINLEVEL_OUTOF10: 0

## 2025-02-28 NOTE — PLAN OF CARE
Problem: Chronic Conditions and Co-morbidities  Goal: Patient's chronic conditions and co-morbidity symptoms are monitored and maintained or improved  Outcome: Progressing  Flowsheets (Taken 2/27/2025 2000)  Care Plan - Patient's Chronic Conditions and Co-Morbidity Symptoms are Monitored and Maintained or Improved: Monitor and assess patient's chronic conditions and comorbid symptoms for stability, deterioration, or improvement     Problem: Discharge Planning  Goal: Discharge to home or other facility with appropriate resources  Outcome: Progressing  Flowsheets (Taken 2/27/2025 2000)  Discharge to home or other facility with appropriate resources: Identify barriers to discharge with patient and caregiver     Problem: Skin/Tissue Integrity  Goal: Skin integrity remains intact  Description: 1.  Monitor for areas of redness and/or skin breakdown  2.  Assess vascular access sites hourly  3.  Every 4-6 hours minimum:  Change oxygen saturation probe site  4.  Every 4-6 hours:  If on nasal continuous positive airway pressure, respiratory therapy assess nares and determine need for appliance change or resting period  Outcome: Progressing  Flowsheets (Taken 2/27/2025 2000)  Skin Integrity Remains Intact:   Monitor for areas of redness and/or skin breakdown   Assess vascular access sites hourly     Problem: Safety - Adult  Goal: Free from fall injury  Outcome: Progressing     Problem: Pain  Goal: Verbalizes/displays adequate comfort level or baseline comfort level  Outcome: Progressing  Flowsheets (Taken 2/27/2025 2241)  Verbalizes/displays adequate comfort level or baseline comfort level: Encourage patient to monitor pain and request assistance

## 2025-02-28 NOTE — PROGRESS NOTES
Bedside report and transfer of care given to KELLY Juárez. Pt currently resting in bed with the call light within reach. Pt denies any other care needs at this time. Pt stable at this time.      Barb Bates RN

## 2025-02-28 NOTE — PROGRESS NOTES
[facility] - Rehabilitation Department      Physical Therapy  3218/3218-01  [facility] 3 PROGRESSIVE CARE    [] Initial Evaluation            [x] Daily Treatment Note         [] Discharge Summary      Patient: Yrn Martinez   : 1940   MRN: [id number]   Date of Service:  2025   Admitting Diagnosis: Rhabdomyolysis  Ordering Physician: Sabrina Sahu MD   Current Admission Summary:   \"Transferred from [place] emergency department on account of ABDELRAHMAN & rhabdomyolysis     Assessment/Plan:      Sepsis: Tachycardia + leukocytosis + ABDELRAHMAN + lactic acidosis.  Blood cultures obtained in the emergency department, received Rocephin.  Only received 500 cc of IV fluids on account of elevated BNP.  UA pending at time of writing.  Proceed with vancomycin + Zosyn.  Monitor closely     Rhabdomyolysis: Was found down after unknown period of time, CK 2455.  Initiate IV NS 75 cc/h x 24 hours.  Follow CK daily.     Suspected ground-level fall: Unclear if/how patient fell.  CT cervical spine without osseous abnormalities.  PT OT to evaluate.  Analgesia as needed.  Patient is said to live with wife who is wheelchair-bound; he is a caregiver     ABDELRAHMAN: SCR 3.1 at time of presentation, baseline unknown however was 1.4 on 2024.  IV fluids as outlined above.  Avoid nephrotoxins, renally dose medications, monitor I's and O's.     Elevated BNP: Patient clinically euvolemic, BNP levels likely secondary to ABDELRAHMAN outlined above     Paroxysmal atrial fibrillation: , does not appear to be on anticoagulation or rate control on an outpatient basis.  Continuous cardiac monitoring     Hypertension: Initially normotensive but up to 170s/80s in the emergency department.  Monitor closely, provide labetalol for exacerbations -- not on antihypertensives prior to admission     QT elongation: QTc 554, avoid QT prolonging medications, continuous cardiac monitoring as outlined above     BEBO: CPAP per home settings     T2DM: Check A1c.   functional mobility training, and transfer training    Goals  Patient Goals:  Patient did not state     Goals :   To be met in by:3/6/25 unless noted otherwise:  1). Independent with LE Ex x 10 reps to maintain/improve AROM/strength met by 3/02: 2/27: min   2). Sit to/from stand:Independent: 2/28: mod-max x2  3). Bed to chair: Independent 2/27: Stedy   4). Gait: Ambulate 150 ft.Independent : 2/28: MIN x2 20ft     Above goals reviewed on 2/28/2025.  All goals are ongoing at this time unless indicated above.      Therapy Session Time      Individual Group Co-treatment   Time In     1214   Time Out     1252   Minutes     38       Timed Code Treatment Minutes:  38 Minutes  Total Treatment Minutes:  38      [x]co-treatment indicated, patient seen for co-treatment due to: patient safety, patient endurance, cognitive status, and behavioral concerns.  PT addressing:[x] Sitting balance/LE WB,  [x]Transfer training, [x] BLE strength/endurance with functional tasks, [x] Standing balance/LE WB [] Other:  OT addressing: [x]ADL's, [x] Pericare,  [x]clothing management, [x] BU E strength/endurance with functional tasks,  [x] UE WB [x] Other: sequencing, orientation, attention to task  Electronically Signed By: Sindhu Navarrete, PT, DPT

## 2025-02-28 NOTE — PROGRESS NOTES
Pharmacist Review and Automatic Dose Adjustment of Prophylactic Enoxaparin    The reviewing pharmacist has made an adjustment to the ordered enoxaparin dose or converted to UFH per the approved [facility] protocol and table as identified below.        Yrn Martinez is a 84 y.o. male.     Recent Labs     02/26/25  1609 02/27/25  0449 02/28/25  0526   CREATININE 2.8* 2.0* 1.2       Estimated Creatinine Clearance: 49 mL/min (based on SCr of 1.2 mg/dL).    Recent Labs     02/26/25  1609 02/27/25  0449   HGB 12.6* 11.8*   HCT 36.2* 35.0*    179     Recent Labs     02/25/25  1802   INR 1.16*       Height:   Ht Readings from Last 1 Encounters:   02/26/25 1.829 m (6')     Weight:  Wt Readings from Last 1 Encounters:   02/27/25 81.6 kg (180 lb)       Plan: Based upon the patient's weight and renal function, the ordered enoxaparin dose of 30 mg daily has been changed/converted to 40 mg daily.      Thank you,    Alex Lee, PharmD  [facility]   [id number]  2/28/2025   12:55 PM

## 2025-02-28 NOTE — PROGRESS NOTES
Physician Progress Note      PATIENT:               VIIK BRIDGES  CSN #:                  [id number]  :                       1940  ADMIT DATE:       2025 2:35 AM  DISCH DATE:  RESPONDING  PROVIDER #:        Cori Steel MD          QUERY TEXT:    Patient admitted with rhabdomyolysis, ABDELRAHMAN, and sepsis.  Noted documentation of   \"Likely DC broad-spectrum antibiotics tomorrow if blood cultures remain   negative and no detectable source\" and  \"SIRS criteria, tachycardia,   leukocytosis, lactic acidosis. Temperature 100.8 on \" in    progress note.  If possible, please document in progress notes and discharge   summary the source of sepsis:    The medical record reflects the following:  Risk Factors: rhabdomyolysis  Clinical Indicators: no identified source of infection; per  progress note   \"SIRS criteria, tachycardia, leukocytosis, lactic acidosis.  Temperature   100.8 on \"  Treatment: IV fluids, UA, blood cultures, trending CK  Options provided:  -- SIRS due to rhabdomyolysis present on admission.  Sepsis ruled out after   study  -- Sepsis, present on admission, due to, Please document source.  -- Other - I will add my own diagnosis  -- Disagree - Not applicable / Not valid  -- Disagree - Clinically unable to determine / Unknown  -- Refer to Clinical Documentation Reviewer    PROVIDER RESPONSE TEXT:    This patient has sepsis which was present on admission due to unclear source    Query created by: Sobia Siddiqui on 2025 8:02 AM      Electronically signed by:  Cori Steel MD 2025 11:20 AM

## 2025-02-28 NOTE — DISCHARGE INSTR - COC
Continuity of Care Form    Patient Name: Yrn Martinez   :  1940  MRN:  [id number]    Admit date:  2025  Discharge date:  3/1/25    Code Status Order: Full Code   Advance Directives:   Advance Care Flowsheet Documentation             Admitting Physician:  Sabrina Sahu MD  PCP: Vicky Nowak DO    Discharging Nurse: KELLY Martinez  Discharging Hospital Unit/Room#: 3218/3218-01  Discharging Unit Phone Number: [phone]    Emergency Contact:   Extended Emergency Contact Information  Primary Emergency Contact: Margoth Martinez  Home Phone: [phone]  Relation: Spouse  Secondary Emergency Contact: erwin staley  Home Phone: [phone]  Mobile Phone: [phone]  Relation: Child   needed? No    Past Surgical History:  Past Surgical History:   Procedure Laterality Date    CARDIOVERSION  2017    CATARACT REMOVAL WITH IMPLANT Right 2019    COLONOSCOPY      INTRACAPSULAR CATARACT EXTRACTION Right 2019    PHACO EMULSIFICATION OF CATARACT WITH INTRAOCULAR LENS IMPLANT EYE performed by Yoan Dunne MD at [facility] [place] [place] OR    OTHER SURGICAL HISTORY      Diabetic retinal exam       Immunization History:   Immunization History   Administered Date(s) Administered    COVID-19, MODERNA, (age 12y+), IM, 50mcg/0.5mL 2023    COVID-19, PFIZER Bivalent, DO NOT Dilute, (age 12y+), IM, 30 mcg/0.3 mL 10/12/2022    COVID-19, PFIZER PURPLE top, DILUTE for use, (age 12 y+), 30mcg/0.3mL 2021, 2021, 10/16/2021    TDaP, ADACEL (age 10y-64y), BOOSTRIX (age 10y+), IM, 0.5mL 2023       Active Problems:  Patient Active Problem List   Diagnosis Code    Rhabdomyolysis M62.82       Isolation/Infection:   Isolation            No Isolation          Patient Infection Status       Infection Onset Added Last Indicated Last Indicated By Review Planned Expiration Resolved Resolved By    None active    Resolved    COVID-19 24 COVID-19, Rapid   24 Infection

## 2025-02-28 NOTE — PROGRESS NOTES
[facility] Progress Note      Name:  Yrn Martinez /Age/Sex: 1940  (84 y.o. male)   MRN & CSN:  [id number] & [id number] Encounter Date/Time: 2025 6:11 PM EST   Location:  3218/3218-01 PCP: Vicky Nowak DO     Attending:Cori Steel MD       Hospital Day: 3          Impression   Yrn Martinez is a 84 y.o. male with known history of hypertension, diabetes mellitus type 2, dementia and obstructive sleep apnea who lives with his wife at home was brought to the ED by EMS after he could not get up.  Workup in ED revealed CK of more than 2500 consistent with rhabdomyolysis as well as an evidence of ABDELRAHMAN.  CT head was nonacute.  Patient also met SIRS criteria.  He was admitted for further evaluation and management of rhabdo, ABDELRAHMAN and sepsis.    Assessment and Plan   Assessment:  # Rhabdomyolysis.  Initial CK around 2500.  CK down to 250, resolved.  # Urinary retention status post Wolff.  Patient had straight cath x 3, but unsuccessful to void on his own. Indwelling Wolff catheter was placed.  # Acute kidney injury, likely postrenal as well secondary to rhabdo. Improving with IV fluids.  # SIRS criteria, tachycardia, leukocytosis, lactic acidosis.  Temperature 100.8 on .  No clear source.  Leukocytosis resolved.  Blood cultures, UA and chest x-ray unrevealing.  # Paroxysmal atrial fibrillation. It seems that patient was on Coumadin but it was last filled 2024 (medication dispense history).  He is on digoxin and he has filled that recently.  Likely the patient was taken off Coumadin due to risk of falls.  # Essential hypertension.  # Obstructive sleep apnea on CPAP.  # Diabetes mellitus type 2, A1c 8.1  # Dementia.    Plan:  # Appreciate nephrology consult.  # DC IV fluids.  # DC broad-spectrum antibiotics, blood cultures remain negative, no fever for over 48 hours.  # May resume digoxin at discharge  # Will not reinitiate Coumadin, continue baby aspirin for stroke prevention.  # PT

## 2025-02-28 NOTE — PROGRESS NOTES
[facility] - Rehabilitation Department       Occupational Therapy  3218/3218-01  [facility] 3 PROGRESSIVE CARE    [] Initial Evaluation            [x] Daily Treatment Note         [] Discharge Summary      Patient: Yrn Martinez   : 1940   MRN: [id number]   Date of Service:  2025    Admitting Diagnosis:  Rhabdomyolysis  Referring Physician: Donaldo Ardon MD   Current Admission Summary: Sabrina Sahu MD H&P :  \"  Chief Complaint:      Transferred from [place] emergency department on account of ABDELRAHMAN & rhabdomyolysis        Assessment/Plan:      Sepsis: Tachycardia + leukocytosis + ABDELRAHMAN + lactic acidosis.  Blood cultures obtained in the emergency department, received Rocephin.  Only received 500 cc of IV fluids on account of elevated BNP.  UA pending at time of writing.  Proceed with vancomycin + Zosyn.  Monitor closely     Rhabdomyolysis: Was found down after unknown period of time, CK 2455.  Initiate IV NS 75 cc/h x 24 hours.  Follow CK daily.     Suspected ground-level fall: Unclear if/how patient fell.  CT cervical spine without osseous abnormalities.  PT OT to evaluate.  Analgesia as needed.  Patient is said to live with wife who is wheelchair-bound; he is a caregiver     ABDELRAHMAN: SCR 3.1 at time of presentation, baseline unknown however was 1.4 on 2024.  IV fluids as outlined above.  Avoid nephrotoxins, renally dose medications, monitor I's and O's.     Elevated BNP: Patient clinically euvolemic, BNP levels likely secondary to ABDELRAHMAN outlined above     Paroxysmal atrial fibrillation: , does not appear to be on anticoagulation or rate control on an outpatient basis.  Continuous cardiac monitoring     Hypertension: Initially normotensive but up to 170s/80s in the emergency department.  Monitor closely, provide labetalol for exacerbations -- not on antihypertensives prior to admission     QT elongation: QTc 554, avoid QT prolonging medications, continuous cardiac monitoring as

## 2025-02-28 NOTE — PROGRESS NOTES
02/28/25 1418   Encounter Summary   Encounter Overview/Reason Attempted Encounter   Service Provided For Patient not available   Referral/Consult From Rounding   Last Encounter  02/28/25  (Patient was sleeping/CK)   Assessment/Intervention/Outcome   Assessment Unable to assess

## 2025-02-28 NOTE — FLOWSHEET NOTE
02/28/25 0815   Vital Signs   Temp 97.3 °F (36.3 °C)   Temp Source Oral   Pulse 74   Heart Rate Source Monitor   Respirations 18   BP (!) 155/71   MAP (Calculated) 99   BP Location Right upper arm   BP Method Automatic   Patient Position Semi fowlers   Pain Assessment   Pain Assessment 0-10   Pain Level 0   Oxygen Therapy   SpO2 97 %   O2 Device None (Room air)   O2 Flow Rate (L/min) 0 L/min     Pt assessment complete; see flow sheets. Vitals completed. Pt remains pleasantly confused. Able to follow commands. Repositioned in bed for comfort and sat up for breakfast. Meds given per MAR. Pt denies any other care needs at this time. Pt stable. Virtual  d/c'd at this time. Bed alarm remains for safety.    Barb Bates RN

## 2025-02-28 NOTE — PROGRESS NOTES
Pt requesting to come off BiPAP. Placed on 3L NC. Tolerating well. RT made aware.    Barb Bates RN

## 2025-02-28 NOTE — PROGRESS NOTES
Nephrology Note                                                                                                                                                                                                                                                                                                                                                               Office : [phone]     Fax :[phone]    Patient's Name: Yrn Martinez  3:16 PM  2/28/2025    Reason for Consult:  ABDELRAHMAN on CKD 3  Requesting Physician:  Vicky Nowak DO  Chief Complaint:  No chief complaint on file.      Assessment/Plan     # ABDELRAHMAN on CKD 3  - ABDELRAHMAN likely 2/2 obstruction and rhabdomyolysis  - Wolff in place d/t urinary retention  - Baseline Cr ~ 1.2  - Creatinine now at baseline  - Hx of albuminuria ~ 500 mg  - Avoid nephrotoxins  - Monitor renal labs     # Rhabdomyolysis  - CK ~ 1.8 k --> 700--> 250   - continue IVF  - monitor CK level    #Sepsis  #Lactic Acidosis  - IV abx per primary  - blood cultures NGTD    # DM 2  - Last A1C 8 in 08/2024  - management per primary    # Afib  - aspirin    #HTN  - PRN labetolol  - not on anti-hypertensive's at home  - monitor    History of Present Ilness:    Yrn Martinez is a 84 y.o. male with past medical history of paroxysmal atrial fibrillation, hypertension, BEBO, sick sinus syndrome, left bundle branch block, T2DM, hyperlipidemia and CKD.  He presented to the emergency department via EMS after being found down by his wife after an unknown amount of time.  Patient lethargic and not able to provide a history to this writer.     Upon arrival at the emergency department, his heart rate was 106 bpm with initial blood pressure 115/75 mmHg but later escalating to 170s/80s.  WBC 17.8, , BUN/creatinine 26/3.1, rest of CBC and CMP reassuring.  PT/INR unremarkable. Initial troponin 95, BNP 3139.  Initial lactate was 2.7.  CK 2555.  Influenza and COVID-19 testing both negative.  Blood cultures were  input(s): \"BNP\" in the last 72 hours.  Lipids: No results for input(s): \"CHOL\", \"TRIG\", \"HDL\" in the last 72 hours.    Invalid input(s): \"LDLCALC\", \"LABVLDL\"  ABGs: No results for input(s): \"PHART\", \"PO2ART\", \"ROD9MZW\" in the last 72 hours.  INR:   Recent Labs     02/25/25  1802   INR 1.16*     UA:  Recent Labs     02/26/25  1545   COLORU Yellow   GLUCOSEU NEGATIVE   BILIRUBINUR NEGATIVE   KETUA NEGATIVE   PHUR 6.0   PROTEINU 100*   UROBILINOGEN 0.2   NITRU NEGATIVE   LEUKOCYTESUR TRACE*      Urine Microscopic:   Recent Labs     02/26/25  1545   BACTERIA 1+*   WBCUA 6 TO 9*   RBCUA 10 TO 20*     Urine Culture: No results for input(s): \"LABURIN\" in the last 72 hours.  Urine Chemistry: No results for input(s): \"CLUR\", \"LABCREA\", \"PROTEINUR\", \"NAUR\" in the last 72 hours.      IMAGING:  No orders to display         Medical Decision Making:  The following items were considered in medical decision making:  Discussion of patient care with other providers  Reviewed clinical lab tests  Reviewed radiology tests  Reviewed other diagnostic tests/interventions    Will be discussed w/  Primary team     Thank you for allowing us to participate in care of Yrn Martinez   Feel free to contact me,     Julius Walsh MD   [facility]  Office : [phone] or through [employer]  Fax :[phone]

## 2025-02-28 NOTE — PROGRESS NOTES
Speech Language Pathology  Clinical Bedside Treatment Note  Facility/Department: [facility] PROGRESSIVE CARE    Recommendations:   Diet recommendation: IDDSI 5 Minced and moist Solids; IDDSI 0 Thin Liquids; Meds PO as tolerated  Instrumentation: Not clinically indicated at this time. Will continue to monitor  Risk management: upright for all intake, stay upright for at least 30 mins after intake, small bites/sips, 1:1 supervision with intake, oral care 2-3x/day to reduce adverse affects in the event of aspiration, increase physical mobility as able, alternate bites/sips, slow rate of intake, general GERD precautions, STRICT aspiration precautions, and hold PO and contact SLP if s/s of aspiration or worsening respiratory status develop.    NAME:Yrn Martinez  : 1940 (84 y.o.)   MRN: [id number]  ROOM: [address] [place] [address]  ADMISSION DATE: 2025  PATIENT DIAGNOSIS(ES): Rhabdomyolysis [M62.82]  No chief complaint on file.    Patient Active Problem List    Diagnosis Date Noted    Rhabdomyolysis 2025     Past Medical History:   Diagnosis Date    Atrial fibrillation (HCC)     Diabetes mellitus (HCC)     Hyperlipidemia     Hypertension     Rosacea     Sleep apnea     SSS (sick sinus syndrome) ([facility])          Past Surgical History:   Procedure Laterality Date    CARDIOVERSION  2017    CATARACT REMOVAL WITH IMPLANT Right 2019    COLONOSCOPY      INTRACAPSULAR CATARACT EXTRACTION Right 2019    PHACO EMULSIFICATION OF CATARACT WITH INTRAOCULAR LENS IMPLANT EYE performed by Yoan Dunne MD at [facility] OR    OTHER SURGICAL HISTORY      Diabetic retinal exam     Allergies   Allergen Reactions    Dairycare [Bacid]     Lisinopril      cough       DATE ONSET: 2025    Date of Evaluation: 2025   Evaluating Therapist: TINO Mata    Chart Reviewed: : [x] Yes [] No     Current Diet: ADULT DIET; Dysphagia - Minced and Moist; 5 carb choices (75 gm/meal)    Medical record review/interview: Per MD

## 2025-02-28 NOTE — PLAN OF CARE
Problem: Chronic Conditions and Co-morbidities  Goal: Patient's chronic conditions and co-morbidity symptoms are monitored and maintained or improved  2/28/2025 0907 by Barb Bates RN  Outcome: Progressing  2/28/2025 0204 by Chandni Lunsford RN  Outcome: Progressing  Flowsheets (Taken 2/27/2025 2000)  Care Plan - Patient's Chronic Conditions and Co-Morbidity Symptoms are Monitored and Maintained or Improved: Monitor and assess patient's chronic conditions and comorbid symptoms for stability, deterioration, or improvement     Problem: Discharge Planning  Goal: Discharge to home or other facility with appropriate resources  2/28/2025 0907 by Barb Bates RN  Outcome: Progressing  2/28/2025 0204 by Chandni Lunsford RN  Outcome: Progressing  Flowsheets (Taken 2/27/2025 2000)  Discharge to home or other facility with appropriate resources: Identify barriers to discharge with patient and caregiver     Problem: Skin/Tissue Integrity  Goal: Skin integrity remains intact  Description: 1.  Monitor for areas of redness and/or skin breakdown  2.  Assess vascular access sites hourly  3.  Every 4-6 hours minimum:  Change oxygen saturation probe site  4.  Every 4-6 hours:  If on nasal continuous positive airway pressure, respiratory therapy assess nares and determine need for appliance change or resting period  2/28/2025 0907 by Barb Bates RN  Outcome: Progressing  Flowsheets (Taken 2/28/2025 0907)  Skin Integrity Remains Intact:   Monitor for areas of redness and/or skin breakdown   Assess vascular access sites hourly  2/28/2025 0204 by Chandni Lunsford, RN  Outcome: Progressing  Flowsheets (Taken 2/27/2025 2000)  Skin Integrity Remains Intact:   Monitor for areas of redness and/or skin breakdown   Assess vascular access sites hourly     Problem: Safety - Adult  Goal: Free from fall injury  2/28/2025 0907 by Barb Bates RN  Outcome: Progressing  2/28/2025 0204 by Chandni Lunsford RN  Outcome: Progressing

## 2025-03-01 VITALS
HEIGHT: 72 IN | SYSTOLIC BLOOD PRESSURE: 148 MMHG | DIASTOLIC BLOOD PRESSURE: 66 MMHG | RESPIRATION RATE: 16 BRPM | BODY MASS INDEX: 24.24 KG/M2 | HEART RATE: 83 BPM | WEIGHT: 179 LBS | OXYGEN SATURATION: 96 % | TEMPERATURE: 98.6 F

## 2025-03-01 LAB
ALBUMIN: 3.2 G/DL (ref 3.4–5)
ANION GAP SERPL CALCULATED.3IONS-SCNC: 12 MMOL/L (ref 3–16)
BACTERIA BLD CULT ORG #2: NORMAL
BACTERIA BLD CULT: NORMAL
BUN SERPL-MCNC: 17 MG/DL (ref 7–20)
CALCIUM SERPL-MCNC: 9 MG/DL (ref 8.3–10.6)
CHLORIDE SERPL-SCNC: 101 MMOL/L (ref 99–110)
CO2 SERPL-SCNC: 24 MMOL/L (ref 21–32)
CREAT SERPL-MCNC: 1.1 MG/DL (ref 0.8–1.3)
GFR, ESTIMATED: 68 ML/MIN/1.73M2
GLUCOSE BLD-MCNC: 110 MG/DL (ref 70–99)
GLUCOSE BLD-MCNC: 111 MG/DL (ref 70–99)
GLUCOSE BLD-MCNC: 115 MG/DL (ref 70–99)
GLUCOSE SERPL-MCNC: 120 MG/DL (ref 70–99)
PHOSPHATE SERPL-MCNC: 3.4 MG/DL (ref 2.5–4.9)
POTASSIUM SERPL-SCNC: 4.1 MMOL/L (ref 3.5–5.1)
SODIUM SERPL-SCNC: 136 MMOL/L (ref 136–145)

## 2025-03-01 PROCEDURE — 94760 N-INVAS EAR/PLS OXIMETRY 1: CPT

## 2025-03-01 PROCEDURE — 82962 GLUCOSE BLOOD TEST: CPT

## 2025-03-01 PROCEDURE — 99232 SBSQ HOSP IP/OBS MODERATE 35: CPT | Performed by: INTERNAL MEDICINE

## 2025-03-01 PROCEDURE — 6360000002 HC RX W HCPCS: Performed by: STUDENT IN AN ORGANIZED HEALTH CARE EDUCATION/TRAINING PROGRAM

## 2025-03-01 PROCEDURE — 6370000000 HC RX 637 (ALT 250 FOR IP): Performed by: INTERNAL MEDICINE

## 2025-03-01 PROCEDURE — 36415 COLL VENOUS BLD VENIPUNCTURE: CPT

## 2025-03-01 PROCEDURE — 80069 RENAL FUNCTION PANEL: CPT

## 2025-03-01 PROCEDURE — 2500000003 HC RX 250 WO HCPCS: Performed by: STUDENT IN AN ORGANIZED HEALTH CARE EDUCATION/TRAINING PROGRAM

## 2025-03-01 PROCEDURE — 6370000000 HC RX 637 (ALT 250 FOR IP): Performed by: HOSPITALIST

## 2025-03-01 RX ORDER — TAMSULOSIN HYDROCHLORIDE 0.4 MG/1
0.4 CAPSULE ORAL DAILY
Qty: 30 CAPSULE | Refills: 3 | Status: SHIPPED | OUTPATIENT
Start: 2025-03-02

## 2025-03-01 RX ORDER — ROPINIROLE 0.25 MG/1
0.25 TABLET, FILM COATED ORAL 2 TIMES DAILY
Status: DISCONTINUED | OUTPATIENT
Start: 2025-03-01 | End: 2025-03-01

## 2025-03-01 RX ADMIN — SODIUM CHLORIDE, PRESERVATIVE FREE 10 ML: 5 INJECTION INTRAVENOUS at 09:31

## 2025-03-01 RX ADMIN — AMLODIPINE BESYLATE 10 MG: 5 TABLET ORAL at 09:30

## 2025-03-01 RX ADMIN — ENOXAPARIN SODIUM 40 MG: 100 INJECTION SUBCUTANEOUS at 09:31

## 2025-03-01 RX ADMIN — ATORVASTATIN CALCIUM 10 MG: 10 TABLET, FILM COATED ORAL at 09:30

## 2025-03-01 RX ADMIN — ASPIRIN 81 MG: 81 TABLET, CHEWABLE ORAL at 09:30

## 2025-03-01 RX ADMIN — TAMSULOSIN HYDROCHLORIDE 0.4 MG: 0.4 CAPSULE ORAL at 09:30

## 2025-03-01 ASSESSMENT — PAIN SCALES - GENERAL
PAINLEVEL_OUTOF10: 0
PAINLEVEL_OUTOF10: 0

## 2025-03-01 NOTE — PROGRESS NOTES
Patient discharged to the [facility]. IV catheter removed, dressing and pressure applied. EMS was given patient report and provided packet. This writer attempted to call [facility], [facility] RN will return call when available.

## 2025-03-01 NOTE — DISCHARGE SUMMARY
Hospital Medicine Discharge Summary    Patient ID: Yrn Martinez      Patient's PCP: Vicky Nowak DO    Admit Date: 2/26/2025     Discharge Date: 3/1/2025      Admitting Physician: Sabrina Sahu MD     Discharge Physician: Sobia Knox, APRN - CNP     Discharge Diagnoses  Rhabdomyolysis  Urine retention status post Wolff placement  ABDELRAHMAN  SIRS  Paroxysmal A-fib  Hypertension  Obstructive sleep apnea on CPAP  Type 2 diabetes  Dementia      Hospital Course: Yrn Martinez is a 84 y.o. male with known history of hypertension, diabetes mellitus type 2, dementia and obstructive sleep apnea who lives with his wife at home was brought to the ED by EMS after he could not get up.  Workup in ED revealed CK of more than 2500 consistent with rhabdomyolysis as well as an evidence of ABDELRAHMAN.  CT head was nonacute.  Patient also met SIRS criteria.  He was admitted for further evaluation and management of rhabdo, ABDELRAHMAN and sepsis.     Assessment:  # Rhabdomyolysis.  Initial CK around 2500.  CK down to 250, resolved.  # Urinary retention status post Wolff.  Patient had straight cath x 3, but unsuccessful to void on his own. Indwelling Wolff catheter was placed.  # Acute kidney injury, likely postrenal as well secondary to rhabdo.  Resolved with IV fluids  # SIRS criteria, tachycardia, leukocytosis, lactic acidosis.  Temperature 100.8 on February 26.  No clear source.  Leukocytosis resolved.  Blood cultures, UA and chest x-ray unrevealing.  SIRS criteria resolved by discharge  # Paroxysmal atrial fibrillation. It seems that patient was on Coumadin but it was last filled August 2024 (medication dispense history).  He is on digoxin and he has filled that recently.  Likely the patient was taken off Coumadin due to risk of falls.  # Essential hypertension.  # Obstructive sleep apnea on CPAP.  # Diabetes mellitus type 2, A1c 8.1  # Dementia.     Plan:  # Appreciate nephrology consult.  # May resume digoxin at discharge  # Will not  WBC 10.0 02/27/2025 04:49 AM    HGB 11.8 02/27/2025 04:49 AM    HCT 35.0 02/27/2025 04:49 AM     02/27/2025 04:49 AM       Renal:    Lab Results   Component Value Date/Time     03/01/2025 05:05 AM    K 4.1 03/01/2025 05:05 AM    K 4.9 02/23/2024 12:00 PM     03/01/2025 05:05 AM    CO2 24 03/01/2025 05:05 AM    BUN 17 03/01/2025 05:05 AM    CREATININE 1.1 03/01/2025 05:05 AM    CALCIUM 9.0 03/01/2025 05:05 AM    PHOS 3.4 03/01/2025 05:05 AM         Significant Diagnostic Studies    Radiology:   No orders to display          Consults:     PHARMACY TO DOSE VANCOMYCIN  IP CONSULT TO NEPHROLOGY    Disposition: ECF    Condition at Discharge: Stable    Discharge Instructions/Follow-up:      Follow up with pcp in 1 week  Follow up with nephrologist as directed     Wolff catheter care, voiding trial in 1 week at ECF    Code Status:  Prior     Activity: activity as tolerated    Diet: cardiac diet      Discharge Medications:     Discharge Medication List as of 3/1/2025 12:52 PM             Details   tamsulosin (FLOMAX) 0.4 MG capsule Take 1 capsule by mouth daily, Disp-30 capsule, R-3Normal                Details   glipiZIDE (GLUCOTROL XL) 2.5 MG extended release tablet Take 5 mg by mouth every morning and 2.5 mg by mouth every eveningHistorical Med      digoxin (LANOXIN) 250 MCG tablet Take 1 tablet by mouth dailyHistorical Med      aspirin 81 MG tablet Take 81 mg by mouth dailyHistorical Med      amLODIPine (NORVASC) 10 MG tablet Take 10 mg by mouth dailyHistorical Med      simvastatin (ZOCOR) 20 MG tablet Take 20 mg by mouth nightlyHistorical Med      Multiple Vitamins-Minerals (THERAPEUTIC MULTIVITAMIN-MINERALS) tablet Take 1 tablet by mouth dailyHistorical Med             Time Spent on discharge is 45 minutes in the examination, evaluation, counseling and review of medications and discharge plan.      Signed:    SHELTON Aguilera - CNP   3/1/2025    The patient was seen and examined on day of

## 2025-03-01 NOTE — DISCHARGE INSTRUCTIONS
Follow up with pcp in 1 week  Follow up with nephrologist as directed     Wolff catheter care, voiding trial in 1 week at [facility]

## 2025-03-01 NOTE — CARE COORDINATION
D/C order noted, VM left for Gary to confirm bed and any time constraints.    Joseline Anderson RN      Addendum 1015: Call back from Mine kyle/Delmi arriaga [place], patient ok to admit anytime.    Joseline Anderson RN

## 2025-03-01 NOTE — PROGRESS NOTES
Bedside report and transfer of care given to KELLY Tariq. Pt currently resting in bed with the call light within reach. Pt denies any other care needs at this time. Pt stable at this time.      Barb Bates RN

## 2025-03-01 NOTE — CARE COORDINATION
CASE MANAGEMENT DISCHARGE SUMMARY      Discharge to: [facility]/SNF    Precertification completed: yes, waiver  Hospital Exemption Notification (HENS) completed: [id number]     IMM given: 2/28/25      Transportation:    Medical Transport explained to pt/family. Pt/family voice no agency preference.       Ambulance form completed: Yes    Confirmed discharge plan with:     Patient: yes     Family:  yes/no    Name: Tash Guerra Contact number: [phone]     Facility/Agency, name:  [facility]   Phone number for report to facility: [phone]     RN, name: Michelle    Note: Discharging nurse to complete KATH, reconcile AVS, and place final copy with patient's discharge packet. RN to ensure that written prescriptions for  Level II medications are sent with patient to the facility as per protocol.

## 2025-03-01 NOTE — PROGRESS NOTES
Nephrology Note                                                                                                                                                                                                                                                                                                                                                               Office : [phone]     Fax :[phone]    Patient's Name: Yrn Martinez      Reason for Consult:  ABDELRAHMAN on CKD 3  Requesting Physician:  Vicky Nowak DO  Chief Complaint:  No chief complaint on file.      Assessment/Plan     # ABDELRAHMAN on CKD 3  - ABDELRAHMAN likely 2/2 obstruction and rhabdomyolysis  - Wolff in place d/t urinary retention  - Baseline Cr ~ 1.2  - Creatinine now at baseline  - Hx of albuminuria ~ 500 mg  - Avoid nephrotoxins  - Monitor renal labs     # Rhabdomyolysis  - CK ~ 1.8 k --> 700--> 250   - continue IVF  - monitor CK level    #Sepsis  #Lactic Acidosis  - IV abx per primary  - blood cultures NGTD    # DM 2  - Last A1C 8 in 08/2024  - management per primary    # Afib  - aspirin    #HTN  - PRN labetolol  - not on anti-hypertensive's at home  - monitor    History of Present Ilness:    Yrn Martinez is a 84 y.o. male with past medical history of paroxysmal atrial fibrillation, hypertension, BEBO, sick sinus syndrome, left bundle branch block, T2DM, hyperlipidemia and CKD.  He presented to the emergency department via EMS after being found down by his wife after an unknown amount of time.  Patient lethargic and not able to provide a history to this writer.     Upon arrival at the emergency department, his heart rate was 106 bpm with initial blood pressure 115/75 mmHg but later escalating to 170s/80s.  WBC 17.8, , BUN/creatinine 26/3.1, rest of CBC and CMP reassuring.  PT/INR unremarkable. Initial troponin 95, BNP 3139.  Initial lactate was 2.7.  CK 2555.  Influenza and COVID-19 testing both negative.  Blood cultures were obtained.  UA not  \"CHOL\", \"TRIG\", \"HDL\" in the last 72 hours.    Invalid input(s): \"LDLCALC\", \"LABVLDL\"  ABGs: No results for input(s): \"PHART\", \"PO2ART\", \"XQB0RDE\" in the last 72 hours.  INR:   No results for input(s): \"INR\" in the last 72 hours.    UA:  Recent Labs     02/26/25  1545   COLORU Yellow   GLUCOSEU NEGATIVE   BILIRUBINUR NEGATIVE   KETUA NEGATIVE   PHUR 6.0   PROTEINU 100*   UROBILINOGEN 0.2   NITRU NEGATIVE   LEUKOCYTESUR TRACE*      Urine Microscopic:   Recent Labs     02/26/25  1545   BACTERIA 1+*   WBCUA 6 TO 9*   RBCUA 10 TO 20*     Urine Culture: No results for input(s): \"LABURIN\" in the last 72 hours.  Urine Chemistry: No results for input(s): \"CLUR\", \"LABCREA\", \"PROTEINUR\", \"NAUR\" in the last 72 hours.      IMAGING:  No orders to display         Medical Decision Making:  The following items were considered in medical decision making:  Discussion of patient care with other providers  Reviewed clinical lab tests  Reviewed radiology tests  Reviewed other diagnostic tests/interventions    Will be discussed w/  Primary team     Thank you for allowing us to participate in care of Yrn Martinez   Feel free to contact me,     Oz Dillon MD   [facility]  Office : [phone] or through [employer]  Fax :[phone]

## 2025-03-01 NOTE — PLAN OF CARE
Problem: Chronic Conditions and Co-morbidities  Goal: Patient's chronic conditions and co-morbidity symptoms are monitored and maintained or improved  3/1/2025 1307 by Michelle German RN  Outcome: Adequate for Discharge  3/1/2025 1005 by Michelle German RN  Outcome: Progressing     Problem: Discharge Planning  Goal: Discharge to home or other facility with appropriate resources  3/1/2025 1307 by Michelle German RN  Outcome: Adequate for Discharge  3/1/2025 1005 by Michelle German RN  Outcome: Progressing     Problem: Skin/Tissue Integrity  Goal: Skin integrity remains intact  Description: 1.  Monitor for areas of redness and/or skin breakdown  2.  Assess vascular access sites hourly  3.  Every 4-6 hours minimum:  Change oxygen saturation probe site  4.  Every 4-6 hours:  If on nasal continuous positive airway pressure, respiratory therapy assess nares and determine need for appliance change or resting period  3/1/2025 1307 by Michelle German RN  Outcome: Adequate for Discharge  3/1/2025 1005 by Michelle German RN  Outcome: Progressing     Problem: Safety - Adult  Goal: Free from fall injury  3/1/2025 1307 by Michelle German RN  Outcome: Adequate for Discharge  3/1/2025 1005 by Michelle German RN  Outcome: Progressing     Problem: Pain  Goal: Verbalizes/displays adequate comfort level or baseline comfort level  3/1/2025 1307 by Michelle German RN  Outcome: Adequate for Discharge  3/1/2025 1005 by Michelle German RN  Outcome: Progressing  Flowsheets (Taken 3/1/2025 0000 by Chandni Lunsford RN)  Verbalizes/displays adequate comfort level or baseline comfort level: Encourage patient to monitor pain and request assistance

## 2025-03-01 NOTE — PLAN OF CARE
Problem: Chronic Conditions and Co-morbidities  Goal: Patient's chronic conditions and co-morbidity symptoms are monitored and maintained or improved  Outcome: Progressing     Problem: Discharge Planning  Goal: Discharge to home or other facility with appropriate resources  Outcome: Progressing     Problem: Skin/Tissue Integrity  Goal: Skin integrity remains intact  Description: 1.  Monitor for areas of redness and/or skin breakdown  2.  Assess vascular access sites hourly  3.  Every 4-6 hours minimum:  Change oxygen saturation probe site  4.  Every 4-6 hours:  If on nasal continuous positive airway pressure, respiratory therapy assess nares and determine need for appliance change or resting period  Outcome: Progressing     Problem: Safety - Adult  Goal: Free from fall injury  Outcome: Progressing     Problem: Pain  Goal: Verbalizes/displays adequate comfort level or baseline comfort level  Outcome: Progressing  Flowsheets (Taken 3/1/2025 0000 by Chandni Lunsford RN)  Verbalizes/displays adequate comfort level or baseline comfort level: Encourage patient to monitor pain and request assistance

## (undated) DEVICE — GAUZE,SPONGE,4"X4",8PLY,STRL,LF,10/TRAY: Brand: MEDLINE

## (undated) DEVICE — SOLUTION IV IRRIG WATER 1000ML POUR BRL 2F7114

## (undated) DEVICE — NEEDLE HYPO 25GA L1.5IN BLU POLYPR HUB S STL REG BVL STR

## (undated) DEVICE — 1010 S-DRAPE TOWEL DRAPE 10/BX: Brand: STERI-DRAPE™

## (undated) DEVICE — Device

## (undated) DEVICE — PREP SOL PVP IODINE 4%  4 OZ/BTL

## (undated) DEVICE — GLOVE ORANGE PI 8   MSG9080

## (undated) DEVICE — 6 ML SYRINGE LUER-LOCK TIP: Brand: MONOJECT

## (undated) DEVICE — CANNULA NSL 13FT TUBE AD ETCO2 DIV SAMP M

## (undated) DEVICE — SURGICAL PROCEDURE PACK EYE CLERMONT